# Patient Record
Sex: MALE | Race: WHITE | NOT HISPANIC OR LATINO | Employment: FULL TIME | URBAN - METROPOLITAN AREA
[De-identification: names, ages, dates, MRNs, and addresses within clinical notes are randomized per-mention and may not be internally consistent; named-entity substitution may affect disease eponyms.]

---

## 2018-01-19 ENCOUNTER — TRANSFERRED RECORDS (OUTPATIENT)
Dept: HEALTH INFORMATION MANAGEMENT | Facility: CLINIC | Age: 33
End: 2018-01-19

## 2018-01-31 ENCOUNTER — OFFICE VISIT (OUTPATIENT)
Dept: FAMILY MEDICINE | Facility: CLINIC | Age: 33
End: 2018-01-31
Payer: COMMERCIAL

## 2018-01-31 VITALS
HEART RATE: 85 BPM | HEIGHT: 68 IN | SYSTOLIC BLOOD PRESSURE: 116 MMHG | BODY MASS INDEX: 26.07 KG/M2 | TEMPERATURE: 98.7 F | DIASTOLIC BLOOD PRESSURE: 70 MMHG | WEIGHT: 172 LBS

## 2018-01-31 DIAGNOSIS — M19.019 AC JOINT ARTHROPATHY: ICD-10-CM

## 2018-01-31 DIAGNOSIS — M75.41 IMPINGEMENT SYNDROME, SHOULDER, RIGHT: Primary | ICD-10-CM

## 2018-01-31 PROCEDURE — 99214 OFFICE O/P EST MOD 30 MIN: CPT | Performed by: FAMILY MEDICINE

## 2018-01-31 NOTE — PATIENT INSTRUCTIONS
Thank you for choosing [facility].  You may be receiving a survey in the mail from Sheldon [facility] regarding your visit today.  Please take a few minutes to complete and return the survey to let us know how we are doing.      If you have questions or concerns, please contact us via [employer] or you can contact your care team at [phone].    Our Clinic hours are:  Monday 6:40 am  to 7:00 pm  Tuesday -Friday 6:40 am to 5:00 pm    The [place] outpatient lab hours are:  Monday - Friday 6:10 am to 4:45 pm  Saturdays 7:00 am to 11:00 am  Appointments are required, call [phone]    If you have clinical questions after hours or would like to schedule an appointment,  call the clinic at [phone].      (M75.41) Impingement syndrome, shoulder, right  (primary encounter diagnosis)  Comment:   Plan: SPORTS MEDICINE REFERRAL        We discussed the issues and he will modify activities and avoid use of the arms out to the side and overhead and throwing, if not at work.   Use ice and avoid heat on the area. Use advil and tylenol if helpful. The referral is done for the Sports Med clinic. We discussed the cortisone shot, to do as needed.    (M19.019) AC joint arthropathy  Comment:   Plan: SPORTS MEDICINE REFERRAL        See above.

## 2018-01-31 NOTE — NURSING NOTE
"Chief Complaint   Patient presents with     Shoulder Pain     Right shoulder pain x 2-3 years.       Initial /89  Pulse 85  Temp 98.7  F (37.1  C) (Tympanic)  Ht 5' 8.25\" (1.734 m)  Wt 172 lb (78 kg)  BMI 25.96 kg/m2 Estimated body mass index is 25.96 kg/(m^2) as calculated from the following:    Height as of this encounter: 5' 8.25\" (1.734 m).    Weight as of this encounter: 172 lb (78 kg).  Medication Reconciliation: complete  "

## 2018-01-31 NOTE — PROGRESS NOTES
"  SUBJECTIVE:   Sachin Palma is a 33 year old male who presents to clinic today for the following health issues:      Musculoskeletal problem/pain      Duration: 2-3 years.    Description  Location: Right shoulder pain.  Can have some symptoms for the left shoulder at times.    Intensity:  moderate    Accompanying signs and symptoms: Burning feeling.  Weakness of the right arm.     History  Previous similar problem: no   Previous evaluation:  He saw Dr. Perez, at [facility] for evaluation-had x-rays done.  Showed some arthritis for the left shoulder.  Last week for that appointment.    Precipitating or alleviating factors:  Trauma or overuse: YES- Overhead use with work-he is an .   Aggravating factors include: Upward motion, he is an  and that can make his symptoms worse.    Therapies tried and outcome: Ibuprofen as needed.  When not working over the weekend the pain will feel better.  Tried changing his routine and doing some exercises.  That seemed like it made it worse.  Ice, tense unit.    No current outpatient prescriptions on file.    Patient Active Problem List   Diagnosis     AC joint arthropathy     Impingement syndrome, shoulder, right       Blood pressure 116/70, pulse 85, temperature 98.7  F (37.1  C), temperature source Tympanic, height 5' 8.25\" (1.734 m), weight 172 lb (78 kg).    Exam:  GENERAL APPEARANCE: healthy, alert and no distress  MS: decreased range of motion of the right shoulder in external rotation; and tender to palpation right AC joint. The left shoulder is not tender.   The biceps tendon is not tender.   SKIN: no suspicious lesions or rashes  NEURO: Normal strength and tone, sensory exam grossly normal, mentation intact and speech normal  PSYCH: mentation appears normal and affect normal/bright      (M75.41) Impingement syndrome, shoulder, right  (primary encounter diagnosis)  Comment:   Plan: SPORTS MEDICINE REFERRAL        We discussed the issues " and he will modify activities and avoid use of the arms out to the side and overhead and throwing, if not at work.   Use ice and avoid heat on the area. Use advil and tylenol if helpful. The referral is done for the Sports Med clinic. We discussed the cortisone shot, to do as needed.    (M19.924) AC joint arthropathy  Comment:   Plan: SPORTS MEDICINE REFERRAL        See above.     Dmitry Walden

## 2018-01-31 NOTE — MR AVS SNAPSHOT
After Visit Summary   1/31/2018    Sachin Palma    MRN: [id number]           Patient Information     Date Of Birth          1985        Visit Information        Provider Department      1/31/2018 7:00 AM Dmitry Walden MD [facility]        Today's Diagnoses     Impingement syndrome, shoulder, right    -  1    AC joint arthropathy          Care Instructions          Thank you for choosing [facility].  You may be receiving a survey in the mail from [facility] your visit today.  Please take a few minutes to complete and return the survey to let us know how we are doing.      If you have questions or concerns, please contact us via [email or web address] or you can contact your care team at [phone].    Our Clinic hours are:  Monday 6:40 am  to 7:00 pm  Tuesday -Friday 6:40 am to 5:00 pm    The [place] outpatient lab hours are:  Monday - Friday 6:10 am to 4:45 pm  Saturdays 7:00 am to 11:00 am  Appointments are required, call [phone]    If you have clinical questions after hours or would like to schedule an appointment,  call the clinic at [phone].      (M75.41) Impingement syndrome, shoulder, right  (primary encounter diagnosis)  Comment:   Plan: SPORTS MEDICINE REFERRAL        We discussed the issues and he will modify activities and avoid use of the arms out to the side and overhead and throwing, if not at work.   Use ice and avoid heat on the area. Use advil and tylenol if helpful. The referral is done for the Sports Med clinic. We discussed the cortisone shot, to do as needed.    (M19.019) AC joint arthropathy  Comment:   Plan: SPORTS MEDICINE REFERRAL        See above.             Follow-ups after your visit        Additional Services     SPORTS MEDICINE REFERRAL       Your provider has referred you to:  [facility]: [facility] [phone]   [email or web address]    Please be aware that coverage of these services is  "subject to the terms and limitations of your health insurance plan.  Call member services at your health plan with any benefit or coverage questions.      Please bring the following to your appointment:    >>   Any x-rays, CTs or MRIs which have been performed.  Contact the facility where they were done to arrange for  prior to your scheduled appointment.    >>   List of current medications   >>   This referral request   >>   Any documents/labs given to you for this referral                  Who to contact     If you have questions or need follow up information about today's clinic visit or your schedule please contact [facility] directly at [phone].  Normal or non-critical lab and imaging results will be communicated to you by [employer], letter or phone within 4 business days after the clinic has received the results. If you do not hear from us within 7 days, please contact the clinic through [employer] or phone. If you have a critical or abnormal lab result, we will notify you by phone as soon as possible.  Submit refill requests through [email or web address] or call your pharmacy and they will forward the refill request to us. Please allow 3 business days for your refill to be completed.          Additional Information About Your Visit        [employer] Information     [email or web address] lets you send messages to your doctor, view your test results, renew your prescriptions, schedule appointments and more. To sign up, go to [email or web address] . Click on \"Log in\" on the left side of the screen, which will take you to the Welcome page. Then click on \"Sign up Now\" on the right side of the page.     You will be asked to enter the access code listed below, as well as some personal information. Please follow the directions to create your username and password.     Your access code is: [email or web address]  Expires: 2018  7:48 AM     Your access code will  in 90 days. If you need help or a new code, please call your " "[facility] or [phone].        Care EveryWhere ID     This is your Care EveryWhere ID. This could be used by other organizations to access your [place] medical records  [id number]        Your Vitals Were     Pulse Temperature Height BMI (Body Mass Index)          85 98.7  F (37.1  C) (Tympanic) 5' 8.25\" (1.734 m) 25.96 kg/m2         Blood Pressure from Last 3 Encounters:   01/31/18 145/89   08/18/15 140/78    Weight from Last 3 Encounters:   01/31/18 172 lb (78 kg)              We Performed the Following     SPORTS MEDICINE REFERRAL        Primary Care Provider Fax #    Physician No Ref-Primary [phone]       No address on file        Equal Access to Services     SONIA HUNTER : Medina Daigle, waaxda luqadaha, qaybta kaalmada adeegyada, oz solano . So [facility] [phone].    ATENCIÓN: Si habla español, tiene a trevizo disposición servicios gratuitos de asistencia lingüística. Llame al [phone].    We comply with applicable federal civil rights laws and [place] laws. We do not discriminate on the basis of race, color, national origin, age, disability, sex, sexual orientation, or gender identity.            Thank you!     Thank you for choosing [facility]  for your care. Our goal is always to provide you with excellent care. Hearing back from our patients is one way we can continue to improve our services. Please take a few minutes to complete the written survey that you may receive in the mail after your visit with us. Thank you!             Your Updated Medication List - Protect others around you: Learn how to safely use, store and throw away your medicines at www.disposemymeds.org.      Notice  As of 1/31/2018  7:48 AM    You have not been prescribed any medications.      "

## 2018-02-09 ENCOUNTER — OFFICE VISIT (OUTPATIENT)
Dept: ORTHOPEDICS | Facility: CLINIC | Age: 33
End: 2018-02-09
Payer: COMMERCIAL

## 2018-02-09 VITALS
DIASTOLIC BLOOD PRESSURE: 72 MMHG | WEIGHT: 172 LBS | HEIGHT: 68 IN | BODY MASS INDEX: 26.07 KG/M2 | SYSTOLIC BLOOD PRESSURE: 130 MMHG

## 2018-02-09 DIAGNOSIS — M25.512 ARTHRALGIA OF BOTH ACROMIOCLAVICULAR JOINTS: Primary | ICD-10-CM

## 2018-02-09 DIAGNOSIS — M25.512 BILATERAL SHOULDER PAIN, UNSPECIFIED CHRONICITY: ICD-10-CM

## 2018-02-09 DIAGNOSIS — M25.511 ARTHRALGIA OF BOTH ACROMIOCLAVICULAR JOINTS: Primary | ICD-10-CM

## 2018-02-09 DIAGNOSIS — M25.511 BILATERAL SHOULDER PAIN, UNSPECIFIED CHRONICITY: ICD-10-CM

## 2018-02-09 PROCEDURE — 99203 OFFICE O/P NEW LOW 30 MIN: CPT | Performed by: PEDIATRICS

## 2018-02-09 NOTE — PROGRESS NOTES
Sports Medicine Clinic Visit  PCP: No Ref-Primary, Physician    Sachin Palma is a 33 year old male who is seen in consultation at the request of Dmitry Walden MD presenting with bilateral shoulder pain (R > L).    Injury: Patient reports bilateral shoulder pain (R > L) for the past 2-3 years.  He denies injury, reports insidious onset that has been gradually getting worse very slowly.  Pain is at his AC joints bilaterally, worse with overhead movements and repetitive activities.  - Did see [facility] who discussed injection and surgical options.    Location of Pain: right shoulder  Duration of Pain: 2-3 year(s)  Rating of Pain at worst: 7/10  Rating of Pain Currently: 4/10  Symptoms are better with: nothing  Symptoms are worse with: overhead motions, adduction, weight lifting  Additional Features:   Positive: weakness   Negative: popping, grinding, catching, locking, instability, paresthesias and numbness  Other evaluation and/or treatments so far consists of: Ice, Ibuprofen, rest and chiropractic, home exercises  Prior History of related problems: none previous to past 2-3 years    Social History: , weight lifting, former baseball/football player    Review of Systems  Skin: no bruising, no swelling  Musculoskeletal: as above  Neurologic: no numbness, paresthesias  Remainder of review of systems is negative including constitutional, CV, pulmonary, GI, except as noted in HPI or medical history.    Patient's current problem list, past medical and surgical history, and family history were reviewed.    Patient Active Problem List   Diagnosis     AC joint arthropathy     Impingement syndrome, shoulder, right     No past medical history on file.  No past surgical history on file.  Family History   Problem Relation Age of Onset     Hypertension Mother      Other - See Comments Father      Stroke     Hyperlipidemia Father        Objective  /72 (BP Location: Right arm, Patient Position:  "Sitting, Cuff Size: Adult Large)  Ht 5' 8.25\" (1.734 m)  Wt 172 lb (78 kg)  BMI 25.96 kg/m2    GENERAL APPEARANCE: healthy, alert and no distress   GAIT: NORMAL  SKIN: no suspicious lesions or rashes  HEENT: Sclera clear, anicteric  CV: good peripheral pulses  RESP: Breathing not labored  NEURO: Normal strength and tone, mentation intact and speech normal  PSYCH:  mentation appears normal and affect normal/bright    Bilateral Shoulder exam  Inspection and Posture:       normal    Skin:        no visible deformities    Tender:        acromioclavicular joint bilateral    Non Tender:       remainder of shoulder bilateral    ROM:        Full active and passive ROM with flexion, extension, abduction, internal and external rotation bilateral       asymmetric scapular motion    Painful motions:       end range flexion and elevation bilateral    Strength:        abduction 5/5 bilateral       flexion 5/5 bilateral       internal rotation 5/5 bilateral       external rotation 5/5 bilateral    Impingement testing:       neg (-) Neer bilateral       neg (-) Keita bilateral       neg (-) crossover bilateral       positive (+) O'jessy bilateral    Sensation:        normal sensation over shoulder and upper extremity     Radiology  I reviewed outside images images with the patient  3 XR views of bilateral reviewed: no acute bony abnormality, no significant degenerative change  - will follow official read    Assessment:  1. Arthralgia of both acromioclavicular joints    2. Bilateral shoulder pain, unspecified chronicity      We discussed these other possible diagnosis: Impingement vs. Labral tear  We discussed the following treatment options: symptom treatment, activity modification/rest, imaging, rehab and referral. Following discussion, plan: will start with physical therapy and consider other options for treatment.    Plan:  - Today's Plan of Care:  [phone]    -We also " discussed other future treatment options:  MRI or MRI arthrogram of the right shoulder  Injection (AC joint or subacromial)    Follow Up: 4-6 weeks    Concerning signs and symptoms were reviewed.  The patient expressed understanding of this management plan and all questions were answered at this time.    Thanks for the opportunity to participate in the care of this patient, I will keep you updated on their progress.    CC: Dmitry Salazar MD [place]  Primary Care Sports Medicine  [facility]

## 2018-02-09 NOTE — MR AVS SNAPSHOT
After Visit Summary   2/9/2018    Sachin Palma    MRN: [id number]           Patient Information     Date Of Birth          1985        Visit Information        Provider Department      2/9/2018 2:40 PM Cecilia Salazar MD [facility] [place]        Today's Diagnoses     Arthralgia of both acromioclavicular joints    -  1    Bilateral shoulder pain, unspecified chronicity          Care Instructions    We discussed these other possible diagnosis: Impingement vs. Labral tear    Plan:  - Today's Plan of Care:  Rehab: Physical Therapy: [facility] - [phone]    -We also discussed other future treatment options:  MRI or MRI arthrogram of the right shoulder  Injection (AC joint or subacromial)    Follow Up: 4-6 weeks              Follow-ups after your visit        Additional Services     PHYSICAL THERAPY REFERRAL       *This therapy referral will be filtered to a centralized scheduling office at [facility] and the patient will receive a call to schedule an appointment at a [facility] location most convenient for them. *     [facility] provides Physical Therapy evaluation and treatment and many specialty services across the [facility] system.  If requesting a specialty program, please choose from the list below.    If you have not heard from the scheduling office within 2 business days, please call [phone] for all locations, with the exception of [place], please call [phone].  Treatment: Evaluation & Treatment  Special Instructions/Modalities: None  Special Programs: None    Please be aware that coverage of these services is subject to the terms and limitations of your health insurance plan.  Call member services at your health plan with any benefit or coverage questions.      **Note to Provider:  If you are referring outside of [facility] for the therapy appointment, please list the name of the location in the  "\"special instructions\" above, print the referral and give to the patient to schedule the appointment.                  Who to contact     If you have questions or need follow up information about today's clinic visit or your schedule please contact [facility] directly at [phone].  Normal or non-critical lab and imaging results will be communicated to you by MyChart, letter or phone within 4 business days after the clinic has received the results. If you do not hear from us within 7 days, please contact the clinic through MyChart or phone. If you have a critical or abnormal lab result, we will notify you by phone as soon as possible.  Submit refill requests through [employer] or call your pharmacy and they will forward the refill request to us. Please allow 3 business days for your refill to be completed.          Additional Information About Your Visit        MyChart Information     [employer] lets you send messages to your doctor, view your test results, renew your prescriptions, schedule appointments and more. To sign up, go to [email or web address] . Click on \"Log in\" on the left side of the screen, which will take you to the Welcome page. Then click on \"Sign up Now\" on the right side of the page.     You will be asked to enter the access code listed below, as well as some personal information. Please follow the directions to create your username and password.     Your access code is: [email or web address]  Expires: 2018  7:48 AM     Your access code will  in 90 days. If you need help or a new code, please call your [place] clinic or [phone].        Care EveryWhere ID     This is your Care EveryWhere ID. This could be used by other organizations to access your [place] medical records  [id number]        Your Vitals Were     Height BMI (Body Mass Index)                5' 8.25\" (1.734 m) 25.96 kg/m2           Blood Pressure from Last 3 Encounters:   18 130/72 "   01/31/18 116/70   08/18/15 140/78    Weight from Last 3 Encounters:   02/09/18 172 lb (78 kg)   01/31/18 172 lb (78 kg)              We Performed the Following     PHYSICAL THERAPY REFERRAL        Primary Care Provider Fax #    Physician No Ref-Primary [phone]       No address on file        Equal Access to Services     ANTONIO [facility] Center[facility]URENDRA : Hadii aad ku hadjennifero Soanthonyali, waaxda luqadaha, qaybta kaalmada halleyrabiada, waxay idiin haybaldemaraguila woodyattilaluis solano . So [place] [phone].    ATENCIÓN: Si habla español, tiene a trevizo disposición servicios gratuitos de asistencia lingüística. Basilioame al [phone].    We comply with applicable federal civil rights laws and [place] laws. We do not discriminate on the basis of race, color, national origin, age, disability, sex, sexual orientation, or gender identity.            Thank you!     Thank you for choosing [place] [facility]  for your care. Our goal is always to provide you with excellent care. Hearing back from our patients is one way we can continue to improve our services. Please take a few minutes to complete the written survey that you may receive in the mail after your visit with us. Thank you!             Your Updated Medication List - Protect others around you: Learn how to safely use, store and throw away your medicines at www.disposemymeds.org.      Notice  As of 2/9/2018  3:10 PM    You have not been prescribed any medications.

## 2018-02-09 NOTE — PATIENT INSTRUCTIONS
We discussed these other possible diagnosis: Impingement vs. Labral tear    Plan:  - Today's Plan of Care:  [facility]hab[phone]    -We also discussed other future treatment options:  MRI or MRI arthrogram of the right shoulder  Injection (AC joint or subacromial)    Follow Up: 4-6 weeks

## 2018-02-12 ENCOUNTER — HOSPITAL ENCOUNTER (OUTPATIENT)
Dept: PHYSICAL THERAPY | Facility: CLINIC | Age: 33
Setting detail: THERAPIES SERIES
End: 2018-02-12
Attending: PEDIATRICS
Payer: COMMERCIAL

## 2018-02-12 PROCEDURE — 97161 PT EVAL LOW COMPLEX 20 MIN: CPT | Mod: GP | Performed by: PHYSICAL THERAPIST

## 2018-02-12 PROCEDURE — 40000718 ZZHC STATISTIC PT DEPARTMENT ORTHO VISIT: Performed by: PHYSICAL THERAPIST

## 2018-02-12 PROCEDURE — 97110 THERAPEUTIC EXERCISES: CPT | Mod: GP | Performed by: PHYSICAL THERAPIST

## 2018-02-12 NOTE — PROGRESS NOTES
PHYSICAL THERAPY INITIAL EVALUATION  02/12/18 1400   General Information   Type of Visit Initial OP Ortho PT Evaluation   Start of Care Date 02/12/18   Referring Physician Dr. Salazar   Patient/Family Goals Statement get rid of the pain   Orders Evaluate and Treat   Date of Order 02/09/18   Insurance Type [employer]   Insurance Comments/Visits Authorized no limits   Medical Diagnosis Arthralgia of both acromioclavicular joints, B shoulder pain   Surgical/Medical history reviewed Yes   Precautions/Limitations no known precautions/limitations   Body Part(s)   Body Part(s) Shoulder   Presentation and Etiology   Pertinent history of current problem (include personal factors and/or comorbidities that impact the POC) Patient reports pain for past couple of years. Getting progressively worse. R side is worse than L. Changed his weight lifting routine some.     Impairments A. Pain;B. Decreased WB tolerance;E. Decreased flexibility;F. Decreased strength and endurance;J. Burning   Functional Limitations perform activities of daily living;perform required work activities;perform desired leisure / sports activities   Symptom Location B ant/superior shoulder   How/Where did it occur With repetition/overuse   Onset date of current episode/exacerbation 02/12/17   Chronicity Chronic   Pain rating (0-10 point scale) Best (/10);Worst (/10)   Best (/10) 1   Worst (/10) 7   Pain quality A. Sharp;B. Dull;D. Burning   Frequency of pain/symptoms C. With activity   Pain/symptoms exacerbated by C. Lifting;D. Carrying;H. Overhead reach   Pain/symptoms eased by C. Rest   Progression of symptoms since onset: Worsened   Prior Level of Function   Prior Level of Function-Mobility independent, likes to work out at the gym   Prior Level of Function-ADLs independent   Current Level of Function   Patient role/employment history A. Employed   Employment Comments    Fall Risk Screen   Fall screen completed by PT   Have you fallen 2 or  more times in the past year? No   Have you fallen and had an injury in the past year? No   Is patient a fall risk? No   Functional Scales   Functional Scales Other   Other Scales  SPADI: 22.31%   Shoulder Objective Findings   Side (if bilateral, select both right and left) Right;Left   Observation L hand dominant   Cervical Screen (ROM, quadrant) WNL    Thoracic Mobility Screen Thoracic Rotation AROM 100% B   Scapulothoracic Rhythm asymmetric scapular motion   Pec Minor (supine) Flexibility tight L > R   Neer's Test -   Keita-Liborio Test -   Coracoid Test -   Bursa Test -   [place]'s Test -   Load and Shift Test -   Sulcus Test -   Crossover Test -   Shoulder Special Tests Comments - speeds, - full can and empty can   Palpation tender B AC joint    Accessory Motion/Joint Mobility tight posterior capsule B   Right Shoulder Flexion AROM 180, pain R AC joint at end range   Right Shoulder Abduction PROM 180, pain R AC joint at end range   Right Shoulder ER AROM T3    Right Shoulder ER PROM 90   Right Shoulder IR AROM T10   Right Shoulder IR PROM 65   Right Shoulder Flexion Strength 5/5   Right Shoulder Abduction Strength 5/5   Right Shoulder ER Strength 5/5   Right Shoulder IR Strength 5/5, mild pain at AC joint   Right Mid Trapezius Strength 5-/5   Right Lower Trapezius Strength 4+/5   Left Shoulder Flexion AROM 180   Left Shoulder ER AROM T3   Left Shoulder ER PROM 90   Left Shoulder IR AROM T10    Left Shoulder IR PROM 65   Left Shoulder Flexion Strength 5/5   Left Shoulder Abduction Strength 5/5   Left Shoulder ER Strength 5/5   Left Shoulder IR Strength 5/5   Left Mid Trapezius Strength 5-/5   Left Lower Trapezius Strength 5-/5   Planned Therapy Interventions   Planned Therapy Interventions joint mobilization;manual therapy;neuromuscular re-education;ROM;strengthening;stretching   Clinical Impression   Criteria for Skilled Therapeutic Interventions Met yes, treatment indicated   PT Diagnosis B shoulder pain    Influenced by the following impairments pain, decreased flexibility, decreased strength   Functional limitations due to impairments lifting, reaching overhead, dressing   Clinical Presentation Stable/Uncomplicated   Clinical Presentation Rationale minimally provocative exam, young and motivated   Clinical Decision Making (Complexity) Low complexity   Therapy Frequency other (see comments)  (1x every other week)   Predicted Duration of Therapy Intervention (days/wks) 6 weeks   Risk & Benefits of therapy have been explained Yes   Patient, Family & other staff in agreement with plan of care Yes   Clinical Impression Comments Patient presenting with signs and symptoms consistent with B AC joint arthritis, can not rule out impingement or labral involvement.   Education Assessment   Preferred Learning Style Listening;Demonstration;Pictures/video   Barriers to Learning No barriers   ORTHO GOALS   PT Ortho Eval Goals 1;2;3;4   Ortho Goal 1   Goal Identifier 1   Goal Description Patient will report no pain with reaching overhead.   Target Date 03/26/18   Ortho Goal 2   Goal Identifier 2   Goal Description Patient will report no pain with doffing shirts.   Target Date 03/26/18   Ortho Goal 3   Goal Identifier 3   Goal Description Patient will report minimal to no pain with lifting or carrying.   Target Date 03/26/18   Ortho Goal 4   Goal Identifier 4   Goal Description Patient will be independent and compliant with HEP to aid functional recovery.   Target Date 03/26/18   Total Evaluation Time   Total Evaluation Time 30       Please contact me with any questions or concerns.  Thank you for your referral.    Naila Mace, PT, DPT, OCS  Physical Therapist, Orthopedic Certified Specialist  [facility]  [phone]

## 2018-03-09 ENCOUNTER — HOSPITAL ENCOUNTER (OUTPATIENT)
Dept: PHYSICAL THERAPY | Facility: CLINIC | Age: 33
Setting detail: THERAPIES SERIES
End: 2018-03-09
Attending: PEDIATRICS
Payer: COMMERCIAL

## 2018-03-09 PROCEDURE — 40000718 ZZHC STATISTIC PT DEPARTMENT ORTHO VISIT: Performed by: PHYSICAL THERAPIST

## 2018-03-09 PROCEDURE — 97110 THERAPEUTIC EXERCISES: CPT | Mod: GP | Performed by: PHYSICAL THERAPIST

## 2018-05-09 ENCOUNTER — OFFICE VISIT (OUTPATIENT)
Dept: ORTHOPEDICS | Facility: CLINIC | Age: 33
End: 2018-05-09
Payer: COMMERCIAL

## 2018-05-09 VITALS
HEIGHT: 68 IN | WEIGHT: 170.9 LBS | DIASTOLIC BLOOD PRESSURE: 68 MMHG | BODY MASS INDEX: 25.9 KG/M2 | SYSTOLIC BLOOD PRESSURE: 122 MMHG

## 2018-05-09 DIAGNOSIS — M25.511 CHRONIC RIGHT SHOULDER PAIN: Primary | ICD-10-CM

## 2018-05-09 DIAGNOSIS — M25.512 BILATERAL SHOULDER PAIN, UNSPECIFIED CHRONICITY: ICD-10-CM

## 2018-05-09 DIAGNOSIS — M25.511 BILATERAL SHOULDER PAIN, UNSPECIFIED CHRONICITY: ICD-10-CM

## 2018-05-09 DIAGNOSIS — M25.511 ARTHRALGIA OF BOTH ACROMIOCLAVICULAR JOINTS: ICD-10-CM

## 2018-05-09 DIAGNOSIS — G89.29 CHRONIC RIGHT SHOULDER PAIN: Primary | ICD-10-CM

## 2018-05-09 DIAGNOSIS — M25.512 ARTHRALGIA OF BOTH ACROMIOCLAVICULAR JOINTS: ICD-10-CM

## 2018-05-09 PROCEDURE — 99214 OFFICE O/P EST MOD 30 MIN: CPT | Performed by: PEDIATRICS

## 2018-05-09 NOTE — PROGRESS NOTES
"Sports Medicine Clinic Visit - Interim History May 9, 2018    PCP: No Ref-Primary, Physician    Sachinchapo Palma is a 33 year old male who is seen in f/u up for    Chronic right shoulder pain  Arthralgia of both acromioclavicular joints  Bilateral shoulder pain, unspecified chronicity. Since last visit on 2/9/18, patient has had two PT visits and has been doing home exercises that have \"helped a little\" but still feeling pain in both shoulders.    Initial Injury History 2/9/2018: Patient reports bilateral shoulder pain (R > L) for the past 2-3 years.  He denies injury, reports insidious onset that has been gradually getting worse very slowly.  Pain is at his AC joints bilaterally, worse with overhead movements and repetitive activities.  - Did see [facility] who discussed injection and surgical options.    Symptoms are better with: PT and home exercises  Symptoms are worse with: overhead motions, adduction, weight lifting  Additional Features:   Positive: weakness   Negative: swelling, bruising, popping, grinding, catching, locking, instability, paresthesias, numbness, pain in other joints and systemic symptoms    Social History: , weight lifting multiple days per week    Review of Systems  Skin: no bruising, no swelling  Musculoskeletal: as above  Neurologic: no numbness, paresthesias  Remainder of review of systems is negative including constitutional, CV, pulmonary, GI, except as noted in HPI or medical history.    Patient's current problem list, past medical and surgical history, and family history were reviewed.    Patient Active Problem List   Diagnosis     AC joint arthropathy     Impingement syndrome, shoulder, right     No past medical history on file.  No past surgical history on file.  Family History   Problem Relation Age of Onset     Hypertension Mother      Other - See Comments Father      Stroke     Hyperlipidemia Father        Objective  /68 (BP Location: Left arm, Patient " "Position: Sitting, Cuff Size: Adult Large)  Ht 5' 8.25\" (1.734 m)  Wt 170 lb 14.4 oz (77.5 kg)  BMI 25.8 kg/m2    GENERAL APPEARANCE: healthy, alert and no distress   GAIT: NORMAL  SKIN: no suspicious lesions or rashes  HEENT: Sclera clear, anicteric  CV: good peripheral pulses  RESP: Breathing not labored  NEURO: Normal strength and tone, mentation intact and speech normal  PSYCH:  mentation appears normal and affect normal/bright    Bilateral Shoulder exam  Inspection and Posture:       normal     Skin:        no visible deformities     Tender:        acromioclavicular joint bilateral     Non Tender:       remainder of shoulder bilateral     ROM:        Full active and passive ROM with flexion, extension, abduction, internal and external rotation bilateral       asymmetric scapular motion     Painful motions:       end range flexion and elevation bilateral     Strength:        abduction 5/5 bilateral       flexion 5/5 bilateral       internal rotation 5/5 bilateral       external rotation 5/5 bilateral     Impingement testing:       neg (-) Neer bilateral       neg (-) Keita bilateral       neg (-) crossover bilateral       positive (+) O'jessy bilateral     Sensation:        normal sensation over shoulder and upper extremity       Radiology  None new    Assessment:  1. Chronic right shoulder pain    2. Arthralgia of both acromioclavicular joints    3. Bilateral shoulder pain, unspecified chronicity      Mild improvement with PT.  We discussed the following treatment options: symptom treatment, activity modification/rest, imaging, rehab and referral. Following discussion, plan: will obtain MRI imaging.  Preference for arthrogram, however, MRI without contrast would likely be adequate.    Plan:  - Today's Plan of Care:  MRI Arthrogram of the right shoulder--Call [phone] to schedule MRI    -We also discussed other future treatment options:  MRI of the right shoulder    Follow Up: In clinic with Dr." Martin after MRI (wait at least 1-2 days)    Concerning signs and symptoms were reviewed.  The patient expressed understanding of this management plan and all questions were answered at this time.    Cecilia Salazar MD CA  Primary Care Sports Medicine  [facility]

## 2018-05-09 NOTE — LETTER
"    5/9/2018         RE: Sachin Palma  [address]  [place] [facility] [address]        Dear Colleague,    Thank you for referring your patient, Sachin Palma, to the [facility]. Please see a copy of my visit note below.    Sports Medicine Clinic Visit - Interim History May 9, 2018    PCP: No Ref-Primary, Physician    Sachin Palma is a 33 year old male who is seen in f/u up for    Chronic right shoulder pain  Arthralgia of both acromioclavicular joints  Bilateral shoulder pain, unspecified chronicity. Since last visit on 2/9/18, patient has had two PT visits and has been doing home exercises that have \"helped a little\" but still feeling pain in both shoulders.    Initial Injury History 2/9/2018: Patient reports bilateral shoulder pain (R > L) for the past 2-3 years.  He denies injury, reports insidious onset that has been gradually getting worse very slowly.  Pain is at his AC joints bilaterally, worse with overhead movements and repetitive activities.  - Did see [facility] who discussed injection and surgical options.    Symptoms are better with: PT and home exercises  Symptoms are worse with: overhead motions, adduction, weight lifting  Additional Features:   Positive: weakness   Negative: swelling, bruising, popping, grinding, catching, locking, instability, paresthesias, numbness, pain in other joints and systemic symptoms    Social History: , weight lifting multiple days per week    Review of Systems  Skin: no bruising, no swelling  Musculoskeletal: as above  Neurologic: no numbness, paresthesias  Remainder of review of systems is negative including constitutional, CV, pulmonary, GI, except as noted in HPI or medical history.    Patient's current problem list, past medical and surgical history, and family history were reviewed.    Patient Active Problem List   Diagnosis     AC joint arthropathy     Impingement syndrome, shoulder, right     No past " "medical history on file.  No past surgical history on file.  Family History   Problem Relation Age of Onset     Hypertension Mother      Other - See Comments Father      Stroke     Hyperlipidemia Father        Objective  /68 (BP Location: Left arm, Patient Position: Sitting, Cuff Size: Adult Large)  Ht 5' 8.25\" (1.734 m)  Wt 170 lb 14.4 oz (77.5 kg)  BMI 25.8 kg/m2    GENERAL APPEARANCE: healthy, alert and no distress   GAIT: NORMAL  SKIN: no suspicious lesions or rashes  HEENT: Sclera clear, anicteric  CV: good peripheral pulses  RESP: Breathing not labored  NEURO: Normal strength and tone, mentation intact and speech normal  PSYCH:  mentation appears normal and affect normal/bright    Bilateral Shoulder exam  Inspection and Posture:       normal     Skin:        no visible deformities     Tender:        acromioclavicular joint bilateral     Non Tender:       remainder of shoulder bilateral     ROM:        Full active and passive ROM with flexion, extension, abduction, internal and external rotation bilateral       asymmetric scapular motion     Painful motions:       end range flexion and elevation bilateral     Strength:        abduction 5/5 bilateral       flexion 5/5 bilateral       internal rotation 5/5 bilateral       external rotation 5/5 bilateral     Impingement testing:       neg (-) Neer bilateral       neg (-) Keita bilateral       neg (-) crossover bilateral       positive (+) O'jessy bilateral     Sensation:        normal sensation over shoulder and upper extremity       Radiology  None new    Assessment:  1. Chronic right shoulder pain    2. Arthralgia of both acromioclavicular joints    3. Bilateral shoulder pain, unspecified chronicity      Mild improvement with PT.  We discussed the following treatment options: symptom treatment, activity modification/rest, imaging, rehab and referral. Following discussion, plan: will obtain MRI imaging.  Preference for arthrogram, however, MRI without " contrast would likely be adequate.    Plan:  - Today's Plan of Care:  MRI Arthrogram of the right shoulder--Call [phone] to schedule MRI    -We also discussed other future treatment options:  MRI of the right shoulder    Follow Up: In clinic with Dr. Salazar after MRI (wait at least 1-2 days)    Concerning signs and symptoms were reviewed.  The patient expressed understanding of this management plan and all questions were answered at this time.    Cecilia Salazar MD [facility]  Primary Care Sports Medicine  [facility]    Again, thank you for allowing me to participate in the care of your patient.        Sincerely,        Cecilia Salazar MD

## 2018-05-09 NOTE — MR AVS SNAPSHOT
After Visit Summary   5/9/2018    Sachin Palma    MRN: [id number]           Patient Information     Date Of Birth          1985        Visit Information        Provider Department      5/9/2018 8:40 AM Cecilia Salazar MD [facility]        Today's Diagnoses     Chronic right shoulder pain    -  1      Care Instructions    Plan:  - Today's Plan of Care:  MRI Arthrogram of the right shoulder--Call [phone] to schedule MRI    -We also discussed other future treatment options:  MRI of the right shoulder    Follow Up: In clinic with Dr. Salazar after MRI (wait at least 1-2 days)    If you have any further questions for your physician or physician s care team you can call [phone] and use option 3 to leave a voice message. Calls received during business hours will be returned same day.    If you have questions about the cost of your care or procedure, please contact the [employer] line at [phone], or visit [email or web address] for an estimate.  You may be asked for your name, date of birth, phone number, insurance information, and description or CPT code for test/procedure.            Follow-ups after your visit        Future tests that were ordered for you today     Open Future Orders        Priority Expected Expires Ordered    MR Shoulder Right w Contrast Routine  5/9/2019 5/9/2018    XR Gadolinium Injection Routine 5/9/2018 5/9/2019 5/9/2018            Who to contact     If you have questions or need follow up information about today's clinic visit or your schedule please contact [facility] directly at [phone].  Normal or non-critical lab and imaging results will be communicated to you by MyChart, letter or phone within 4 business days after the clinic has received the results. If you do not hear from us within 7 days, please contact the clinic through MyChart or phone. If you have a critical or abnormal  "lab result, we will notify you by phone as soon as possible.  Submit refill requests through [employer] or call your pharmacy and they will forward the refill request to us. Please allow 3 business days for your refill to be completed.          Additional Information About Your Visit        [employer] Information     [employer] lets you send messages to your doctor, view your test results, renew your prescriptions, schedule appointments and more. To sign up, go to [email or web address] . Click on \"Log in\" on the left side of the screen, which will take you to the Welcome page. Then click on \"Sign up Now\" on the right side of the page.     You will be asked to enter the access code listed below, as well as some personal information. Please follow the directions to create your username and password.     Your access code is: [email or web address]  Expires: 2018  9:19 AM     Your access code will  in 90 days. If you need help or a new code, please call your [facility] [place] clinic or [phone].        Care EveryWhere ID     This is your Care EveryWhere ID. This could be used by other organizations to access your [facility] medical records  [id number]        Your Vitals Were     Height BMI (Body Mass Index)                5' 8.25\" (1.734 m) 25.8 kg/m2           Blood Pressure from Last 3 Encounters:   18 122/68   18 130/72   18 116/70    Weight from Last 3 Encounters:   18 170 lb 14.4 oz (77.5 kg)   18 172 lb (78 kg)   18 172 lb (78 kg)               Primary Care Provider Fax #    Physician No Ref-Primary [phone]       No address on file        Equal Access to Services     SONIA HUNTER : Medina Daigle, tramaine hall, sanam castellanosalnany arevalo, oz lindquist. [facility] [phone].    ATENCIÓN: Si habla español, tiene a trevizo disposición servicios gratuitos de asistencia lingüística. Llame al [phone].    We comply with applicable federal civil " rights laws and [place] laws. We do not discriminate on the basis of race, color, national origin, age, disability, sex, sexual orientation, or gender identity.            Thank you!     Thank you for choosing [facility]  for your care. Our goal is always to provide you with excellent care. Hearing back from our patients is one way we can continue to improve our services. Please take a few minutes to complete the written survey that you may receive in the mail after your visit with us. Thank you!             Your Updated Medication List - Protect others around you: Learn how to safely use, store and throw away your medicines at www.disposemymeds.org.      Notice  As of 5/9/2018  9:20 AM    You have not been prescribed any medications.

## 2018-05-09 NOTE — PATIENT INSTRUCTIONS
Plan:  - Today's Plan of Care:  MRI Arthrogram of the right shoulder--Call [phone] to schedule MRI    -We also discussed other future treatment options:  MRI of the right shoulder    Follow Up: In clinic with Dr. Salazar after MRI (wait at least 1-2 days)    If you have any further questions for your physician or physician s care team you can call [phone] and use option 3 to leave a voice message. Calls received during business hours will be returned same day.    If you have questions about the cost of your care or procedure, please contact the [employer] price line at [phone], or visit [email or web address] for an estimate.  You may be asked for your name, date of birth, phone number, insurance information, and description or CPT code for test/procedure.

## 2018-05-14 ENCOUNTER — HOSPITAL ENCOUNTER (OUTPATIENT)
Dept: MRI IMAGING | Facility: CLINIC | Age: 33
End: 2018-05-14
Attending: PEDIATRICS
Payer: COMMERCIAL

## 2018-05-14 ENCOUNTER — HOSPITAL ENCOUNTER (OUTPATIENT)
Dept: GENERAL RADIOLOGY | Facility: CLINIC | Age: 33
Discharge: HOME | End: 2018-05-14
Attending: PEDIATRICS | Admitting: PEDIATRICS
Payer: COMMERCIAL

## 2018-05-14 DIAGNOSIS — M25.511 CHRONIC RIGHT SHOULDER PAIN: ICD-10-CM

## 2018-05-14 DIAGNOSIS — G89.29 CHRONIC RIGHT SHOULDER PAIN: ICD-10-CM

## 2018-05-14 PROCEDURE — 25500064 ZZH RX 255 OP 636: Performed by: RADIOLOGY

## 2018-05-14 PROCEDURE — 25000125 ZZHC RX 250: Performed by: RADIOLOGY

## 2018-05-14 PROCEDURE — 25000128 H RX IP 250 OP 636: Performed by: RADIOLOGY

## 2018-05-14 PROCEDURE — 73222 MRI JOINT UPR EXTREM W/DYE: CPT | Mod: RT

## 2018-05-14 PROCEDURE — 40000265 XR GADOLINIUM INJECTION

## 2018-05-14 PROCEDURE — A9579 GAD-BASE MR CONTRAST NOS,1ML: HCPCS | Performed by: RADIOLOGY

## 2018-05-14 PROCEDURE — 27210995 ZZH RX 272: Performed by: RADIOLOGY

## 2018-05-14 RX ORDER — IOPAMIDOL 408 MG/ML
10 INJECTION, SOLUTION INTRATHECAL ONCE
Status: COMPLETED | OUTPATIENT
Start: 2018-05-14 | End: 2018-05-14

## 2018-05-14 RX ORDER — ACYCLOVIR 200 MG/1
3 CAPSULE ORAL ONCE
Status: COMPLETED | OUTPATIENT
Start: 2018-05-14 | End: 2018-05-14

## 2018-05-14 RX ORDER — EPINEPHRINE 1 MG/ML
0.1 INJECTION, SOLUTION, CONCENTRATE INTRAVENOUS ONCE
Status: COMPLETED | OUTPATIENT
Start: 2018-05-14 | End: 2018-05-14

## 2018-05-14 RX ORDER — LIDOCAINE HYDROCHLORIDE 10 MG/ML
5 INJECTION, SOLUTION EPIDURAL; INFILTRATION; INTRACAUDAL; PERINEURAL ONCE
Status: COMPLETED | OUTPATIENT
Start: 2018-05-14 | End: 2018-05-14

## 2018-05-14 RX ADMIN — SODIUM CHLORIDE 12 ML: 9 INJECTION, SOLUTION INTRAMUSCULAR; INTRAVENOUS; SUBCUTANEOUS at 10:44

## 2018-05-14 RX ADMIN — IOPAMIDOL 1 ML: 408 INJECTION, SOLUTION INTRATHECAL at 10:45

## 2018-05-14 RX ADMIN — LIDOCAINE HYDROCHLORIDE 2 ML: 10 INJECTION, SOLUTION EPIDURAL; INFILTRATION; INTRACAUDAL; PERINEURAL at 10:46

## 2018-05-14 RX ADMIN — GADOPENTETATE DIMEGLUMINE 0.15 ML: 469.01 INJECTION INTRAVENOUS at 10:48

## 2018-05-14 RX ADMIN — EPINEPHRINE 0.02 MG: 1 INJECTION, SOLUTION, CONCENTRATE INTRAVENOUS at 10:47

## 2018-05-17 ENCOUNTER — TELEPHONE (OUTPATIENT)
Dept: ORTHOPEDICS | Facility: CLINIC | Age: 33
End: 2018-05-17

## 2018-05-17 NOTE — TELEPHONE ENCOUNTER
Patient called at 15:22. He wanted to let us know that he has his MRI done on Monday. He said that he has an appointment for next Wednesday to go over the results but if we wanted to look at it early he wanted to make sure to know that he had it done.    Toshia Avery RT (R)

## 2018-05-17 NOTE — TELEPHONE ENCOUNTER
MR RIGHT SHOULDER WITH CONTRAST 5/14/2018 11:19 AM      HISTORY: Evaluate for labral tear. Chronic right shoulder pain.     TECHNIQUE:  Multiplanar, multisequence with intraarticular contrast.  Injection procedure dictated separately.     FINDINGS:  Osseous Acromion Outlet:  There is active AC arthrosis, including a  small amount of joint fluid, reactive bone edema, and mild inferior  hypertrophic change.  Mild lateral acromial downsloping. Type 1  acromion.  No os acromiale.     Rotator Cuff: No supraspinatus tendon tear or significant tendinosis.  Small focus of mild infraspinatus tendinosis. No subscapularis tendon  tear or significant tendinosis.  Intact teres minor tendon. No  asymmetric muscle edema or atrophy.      Labral Structures: There is tearing of the superior labrum (SLAP  lesion). This extends anteriorly to the mid anterior labrum, as well  as posterosuperiorly. There is also tearing along the inferior labrum  and anteroinferior labrum. There is tearing of the mid posterior and  posteroinferior labrum. The tearing appears almost completely  circumferential. There is labral cyst formation (measuring 2.2 cm)  posterosuperiorly which extends near the spinoglenoid notch. There is  also mild labral cyst formation anteriorly, inferiorly, posteriorly.     Biceps Tendon: No tear, dislocation, or significant tendinosis.     Osseous and Cartilaginous Structures:  No bone contusion or fracture.  No glenohumeral osteoarthritis or apparent chondromalacia identified.     Joint Space: No definite synovitis is appreciated.     Additional Findings: No gadolinium within the subacromial-subdeltoid  bursa.          IMPRESSION:  1. Labral tearing which is almost completely circumferential.  Associated labral cyst formation.  2. AC arthrosis.  3. Mild focal infraspinatus tendinosis.     KORI COLEMAN MD

## 2018-05-17 NOTE — TELEPHONE ENCOUNTER
Please call patient with MRI results as noted below:    IMPRESSION:  1. Labral tearing which is almost completely circumferential.  Associated labral cyst formation.  2. AC arthrosis.  3. Mild focal infraspinatus tendinosis.    In Summary:  - Labral tearing with labral cyst  - AC arthritis  - RC tendinosis    I Recommend:  - Options for further treatment including continued PT, surgical referral or consideration of injections (AC v. Glenohumeral with Dr. Woodruff under US guidance)  - Can discuss options further at follow up visit if desired    Cecilia Salazar MD

## 2018-05-21 NOTE — TELEPHONE ENCOUNTER
Spoke to patient discussed MRI shoulder results per Dr Salazar. Patient elected to return to clinic for f/u to discuss next steps. Patient scheduled for an appointment on 5/23/2018.     No action needed by physician.  Elodia Santiago ATC

## 2018-05-23 ENCOUNTER — OFFICE VISIT (OUTPATIENT)
Dept: ORTHOPEDICS | Facility: CLINIC | Age: 33
End: 2018-05-23
Payer: COMMERCIAL

## 2018-05-23 VITALS
SYSTOLIC BLOOD PRESSURE: 124 MMHG | BODY MASS INDEX: 25.76 KG/M2 | HEIGHT: 68 IN | DIASTOLIC BLOOD PRESSURE: 86 MMHG | WEIGHT: 170 LBS

## 2018-05-23 DIAGNOSIS — S43.431D TEAR OF RIGHT GLENOID LABRUM, SUBSEQUENT ENCOUNTER: ICD-10-CM

## 2018-05-23 DIAGNOSIS — M25.511 CHRONIC RIGHT SHOULDER PAIN: Primary | ICD-10-CM

## 2018-05-23 DIAGNOSIS — M25.511 ARTHRALGIA OF RIGHT ACROMIOCLAVICULAR JOINT: ICD-10-CM

## 2018-05-23 DIAGNOSIS — G89.29 CHRONIC RIGHT SHOULDER PAIN: Primary | ICD-10-CM

## 2018-05-23 PROCEDURE — 99213 OFFICE O/P EST LOW 20 MIN: CPT | Performed by: PEDIATRICS

## 2018-05-23 NOTE — PROGRESS NOTES
"Sports Medicine Clinic Visit - Interim History May 23, 2018    PCP: No Ref-Primary, Physician    Sachinchapo Palma is a 33 year old male who is seen in f/u up for    Chronic right shoulder pain  Arthralgia of both acromioclavicular joints  Since last visit on 5/9/2018, patient has continued to have aching through the AC joint. He has had a lighter load at work however continues to have pain. He is continuing home exercises. His concern is having the ability to bow hunt in September. Here to review MRI results.    Symptoms are better with: nothing  Symptoms are worse with: lifting with resistance, overhead motions  Additional Features:   Positive: weakness   Negative: swelling, bruising, popping, grinding, catching, locking, instability, paresthesias, numbness, pain in other joints and systemic symptoms    Social History: , weight lifting multiple days per week    Review of Systems  Skin: no bruising, no swelling  Musculoskeletal: as above  Neurologic: no numbness, paresthesias  Remainder of review of systems is negative including constitutional, CV, pulmonary, GI, except as noted in HPI or medical history.    Patient's current problem list, past medical and surgical history, and family history were reviewed.    Patient Active Problem List   Diagnosis     AC joint arthropathy     Impingement syndrome, shoulder, right     History reviewed. No pertinent past medical history.  History reviewed. No pertinent surgical history.  Family History   Problem Relation Age of Onset     Hypertension Mother      Other - See Comments Father      Stroke     Hyperlipidemia Father        Objective  /86  Ht 5' 8.25\" (1.734 m)  Wt 170 lb (77.1 kg)  BMI 25.66 kg/m2    GENERAL APPEARANCE: healthy, alert and no distress   GAIT: NORMAL  SKIN: no suspicious lesions or rashes  HEENT: Sclera clear, anicteric  CV: good peripheral pulses  RESP: Breathing not labored  NEURO: Normal strength and tone, mentation intact and " speech normal  PSYCH:  mentation appears normal and affect normal/bright    Bilateral Shoulder exam  Inspection and Posture:       normal      Skin:        no visible deformities      Tender:        acromioclavicular joint right      Non Tender:       remainder of shoulder bilateral      ROM:        Full active and passive ROM with flexion, extension, abduction, internal and external rotation bilateral       asymmetric scapular motion      Painful motions:       end range flexion and elevation bilateral      Strength:        abduction 5/5 bilateral       flexion 5/5 bilateral       internal rotation 5/5 bilateral       external rotation 5/5 bilateral      Impingement testing:       neg (-) Neer bilateral       neg (-) Keita bilateral       neg (-) crossover bilateral       positive (+) O'jessy bilateral      Sensation:        normal sensation over shoulder and upper extremity     Radiology  I ordered, visualized and reviewed these images with the patient  Results for orders placed or performed during the hospital encounter of 05/14/18   MR Shoulder Right w Contrast    Narrative    MR RIGHT SHOULDER WITH CONTRAST 5/14/2018 11:19 AM     HISTORY: Evaluate for labral tear. Chronic right shoulder pain.    TECHNIQUE:  Multiplanar, multisequence with intraarticular contrast.  Injection procedure dictated separately.    FINDINGS:  Osseous Acromion Outlet:  There is active AC arthrosis, including a  small amount of joint fluid, reactive bone edema, and mild inferior  hypertrophic change.  Mild lateral acromial downsloping. Type 1  acromion.  No os acromiale.    Rotator Cuff: No supraspinatus tendon tear or significant tendinosis.  Small focus of mild infraspinatus tendinosis. No subscapularis tendon  tear or significant tendinosis.  Intact teres minor tendon. No  asymmetric muscle edema or atrophy.     Labral Structures: There is tearing of the superior labrum (SLAP  lesion). This extends anteriorly to the mid anterior  labrum, as well  as posterosuperiorly. There is also tearing along the inferior labrum  and anteroinferior labrum. There is tearing of the mid posterior and  posteroinferior labrum. The tearing appears almost completely  circumferential. There is labral cyst formation (measuring 2.2 cm)  posterosuperiorly which extends near the spinoglenoid notch. There is  also mild labral cyst formation anteriorly, inferiorly, posteriorly.    Biceps Tendon: No tear, dislocation, or significant tendinosis.    Osseous and Cartilaginous Structures:  No bone contusion or fracture.  No glenohumeral osteoarthritis or apparent chondromalacia identified.    Joint Space: No definite synovitis is appreciated.    Additional Findings: No gadolinium within the subacromial-subdeltoid  bursa.       Impression    IMPRESSION:  1. Labral tearing which is almost completely circumferential.  Associated labral cyst formation.  2. AC arthrosis.  3. Mild focal infraspinatus tendinosis.    KORI COLEMAN MD       Assessment:  1. Chronic right shoulder pain    2. Arthralgia of right acromioclavicular joint    3. Tear of right glenoid labrum, subsequent encounter      Right shoulder pain with evidence of AC joint arthrosis as well as labral tearing.  Most of patient's symptoms currently seem to be at AC joint.  We discussed options of trial of AC joint injection, glenohumeral injection or surgical referral.  We will start with an injection and I would target the AC joint first given current symptoms.  Continue home exercise program.    Plan:  Today's Plan of Care:  Referral to Dr. Foster Woodruff for US guided AC joint injection. Scheduling will call you to schedule.    -We also discussed other future treatment options:  Referral to orthopedic surgery    Follow Up: As needed if symptoms fail to improve or worsen. Please call with any questions or concerns.    Concerning signs and symptoms were reviewed.  The patient expressed understanding of this management plan  and all questions were answered at this time.    Cecilia Salazar MD CAQ  Primary Care Sports Medicine  [facility]

## 2018-05-23 NOTE — LETTER
"    5/23/2018         RE: Sachin Palma  [address] [place] [address]        Dear Colleague,    Thank you for referring your patient, Sachin Palma, to the [facility]. Please see a copy of my visit note below.    Sports Medicine Clinic Visit - Interim History May 23, 2018    PCP: No Ref-Primary, Physician    Sachin Palma is a 33 year old male who is seen in f/u up for    Chronic right shoulder pain  Arthralgia of both acromioclavicular joints  Since last visit on 5/9/2018, patient has continued to have aching through the AC joint. He has had a lighter load at work however continues to have pain. He is continuing home exercises. His concern is having the ability to bow hunt in September. Here to review MRI results.    Symptoms are better with: nothing  Symptoms are worse with: lifting with resistance, overhead motions  Additional Features:   Positive: weakness   Negative: swelling, bruising, popping, grinding, catching, locking, instability, paresthesias, numbness, pain in other joints and systemic symptoms    Social History: , weight lifting multiple days per week    Review of Systems  Skin: no bruising, no swelling  Musculoskeletal: as above  Neurologic: no numbness, paresthesias  Remainder of review of systems is negative including constitutional, CV, pulmonary, GI, except as noted in HPI or medical history.    Patient's current problem list, past medical and surgical history, and family history were reviewed.    Patient Active Problem List   Diagnosis     AC joint arthropathy     Impingement syndrome, shoulder, right     History reviewed. No pertinent past medical history.  History reviewed. No pertinent surgical history.  Family History   Problem Relation Age of Onset     Hypertension Mother      Other - See Comments Father      Stroke     Hyperlipidemia Father        Objective  /86  Ht 5' 8.25\" (1.734 m)  Wt 170 lb (77.1 kg)  BMI " 25.66 kg/m2    GENERAL APPEARANCE: healthy, alert and no distress   GAIT: NORMAL  SKIN: no suspicious lesions or rashes  HEENT: Sclera clear, anicteric  CV: good peripheral pulses  RESP: Breathing not labored  NEURO: Normal strength and tone, mentation intact and speech normal  PSYCH:  mentation appears normal and affect normal/bright    Bilateral Shoulder exam  Inspection and Posture:       normal      Skin:        no visible deformities      Tender:        acromioclavicular joint right      Non Tender:       remainder of shoulder bilateral      ROM:        Full active and passive ROM with flexion, extension, abduction, internal and external rotation bilateral       asymmetric scapular motion      Painful motions:       end range flexion and elevation bilateral      Strength:        abduction 5/5 bilateral       flexion 5/5 bilateral       internal rotation 5/5 bilateral       external rotation 5/5 bilateral      Impingement testing:       neg (-) Neer bilateral       neg (-) Keita bilateral       neg (-) crossover bilateral       positive (+) O'jessy bilateral      Sensation:        normal sensation over shoulder and upper extremity     Radiology  I ordered, visualized and reviewed these images with the patient  Results for orders placed or performed during the hospital encounter of 05/14/18   MR Shoulder Right w Contrast    Narrative    MR RIGHT SHOULDER WITH CONTRAST 5/14/2018 11:19 AM     HISTORY: Evaluate for labral tear. Chronic right shoulder pain.    TECHNIQUE:  Multiplanar, multisequence with intraarticular contrast.  Injection procedure dictated separately.    FINDINGS:  Osseous Acromion Outlet:  There is active AC arthrosis, including a  small amount of joint fluid, reactive bone edema, and mild inferior  hypertrophic change.  Mild lateral acromial downsloping. Type 1  acromion.  No os acromiale.    Rotator Cuff: No supraspinatus tendon tear or significant tendinosis.  Small focus of mild infraspinatus  tendinosis. No subscapularis tendon  tear or significant tendinosis.  Intact teres minor tendon. No  asymmetric muscle edema or atrophy.     Labral Structures: There is tearing of the superior labrum (SLAP  lesion). This extends anteriorly to the mid anterior labrum, as well  as posterosuperiorly. There is also tearing along the inferior labrum  and anteroinferior labrum. There is tearing of the mid posterior and  posteroinferior labrum. The tearing appears almost completely  circumferential. There is labral cyst formation (measuring 2.2 cm)  posterosuperiorly which extends near the spinoglenoid notch. There is  also mild labral cyst formation anteriorly, inferiorly, posteriorly.    Biceps Tendon: No tear, dislocation, or significant tendinosis.    Osseous and Cartilaginous Structures:  No bone contusion or fracture.  No glenohumeral osteoarthritis or apparent chondromalacia identified.    Joint Space: No definite synovitis is appreciated.    Additional Findings: No gadolinium within the subacromial-subdeltoid  bursa.       Impression    IMPRESSION:  1. Labral tearing which is almost completely circumferential.  Associated labral cyst formation.  2. AC arthrosis.  3. Mild focal infraspinatus tendinosis.    KORI COLEMAN MD       Assessment:  1. Chronic right shoulder pain    2. Arthralgia of right acromioclavicular joint    3. Tear of right glenoid labrum, subsequent encounter      Right shoulder pain with evidence of AC joint arthrosis as well as labral tearing.  Most of patient's symptoms currently seem to be at AC joint.  We discussed options of trial of AC joint injection, glenohumeral injection or surgical referral.  We will start with an injection and I would target the AC joint first given current symptoms.  Continue home exercise program.    Plan:  Today's Plan of Care:  Referral to Dr. Foster Woodruff for US guided AC joint injection. Scheduling will call you to schedule.    -We also discussed other future  treatment options:  Referral to orthopedic surgery    Follow Up: As needed if symptoms fail to improve or worsen. Please call with any questions or concerns.    Concerning signs and symptoms were reviewed.  The patient expressed understanding of this management plan and all questions were answered at this time.    Cecilia Salazar MD CA  Primary Care Sports Medicine  [facility]        Again, thank you for allowing me to participate in the care of your patient.        Sincerely,        Cecilia Salazar MD

## 2018-05-23 NOTE — PATIENT INSTRUCTIONS
Today's Plan of Care:  Referral to Dr. Foster Woodruff for US guided AC joint injection. Scheduling will call you to schedule.    -We also discussed other future treatment options:  Referral to orthopedic surgery    Follow Up: As needed if symptoms fail to improve or worsen. Please call with any questions or concerns.

## 2018-05-23 NOTE — MR AVS SNAPSHOT
After Visit Summary   5/23/2018    Sachin Palma    MRN: [id number]           Patient Information     Date Of Birth          1985        Visit Information        Provider Department      5/23/2018 8:00 AM Cecilia Salazar MD [facility]        Today's Diagnoses     Chronic right shoulder pain    -  1    Arthralgia of right acromioclavicular joint        Tear of right glenoid labrum, subsequent encounter          Care Instructions    Today's Plan of Care:  Referral to Dr. Foster Woodruff for US guided AC joint injection. Scheduling will call you to schedule.    -We also discussed other future treatment options:  Referral to orthopedic surgery    Follow Up: As needed if symptoms fail to improve or worsen. Please call with any questions or concerns.               Follow-ups after your visit        Additional Services     ORTHO  REFERRAL       [facility] is referring you to the Orthopedic  Services at [facility].       The  Representative will assist you in the coordination of your Orthopedic and Musculoskeletal Care as prescribed by your physician.    The  Representative will call you within 24 hours to help schedule your appointment, or you may contact the  Representative at:    [facility] ~ [phone]  [facility] ~ [phone]  [facility] ~ [phone]    Type of Referral : Non Surgical - US guided AC joint injection with Dr. Woodruff      Timeframe requested: Within 2 weeks     Coverage of these services is subject to the terms and limitations of your health insurance plan.  Please call member services at your health plan with any benefit or coverage questions.      If X-rays, CT or MRI's have been performed, please contact the facility where they were done to arrange for , prior to your scheduled appointment.  Please bring this  "referral request to your appointment and present it to your specialist.                  Who to contact     If you have questions or need follow up information about today's clinic visit or your schedule please contact [facility] directly at [phone].  Normal or non-critical lab and imaging results will be communicated to you by MyChart, letter or phone within 4 business days after the clinic has received the results. If you do not hear from us within 7 days, please contact the clinic through [employer] or phone. If you have a critical or abnormal lab result, we will notify you by phone as soon as possible.  Submit refill requests through [employer] or call your pharmacy and they will forward the refill request to us. Please allow 3 business days for your refill to be completed.          Additional Information About Your Visit        [employer] Information     [employer] gives you secure access to your electronic health record. If you see a primary care provider, you can also send messages to your care team and make appointments. If you have questions, please call your primary care clinic.  If you do not have a primary care provider, please call [phone] and they will assist you.        Care EveryWhere ID     This is your Care EveryWhere ID. This could be used by other organizations to access your [facility] medical records  [id number]        Your Vitals Were     Height BMI (Body Mass Index)                5' 8.25\" (1.734 m) 25.66 kg/m2           Blood Pressure from Last 3 Encounters:   05/23/18 124/86   05/09/18 122/68   02/09/18 130/72    Weight from Last 3 Encounters:   05/23/18 170 lb (77.1 kg)   05/09/18 170 lb 14.4 oz (77.5 kg)   02/09/18 172 lb (78 kg)              We Performed the Following     ORTHO  REFERRAL        Primary Care Provider Fax #    Physician No Ref-Primary [phone]       No address on file        Equal Access to Services     SONIA PAEZ: Medina chairez " adelian Daigle, tramaine hall, sanam zepedamayulisa rowleyrabiayulisa, waxkaren joann woodyattilaluis solano lexa. So [facility] [phone].    ATENCIÓN: Si habla español, tiene a trevizo disposición servicios gratuitos de asistencia lingüística. Llame al [phone].    We comply with applicable federal civil rights laws and [place] laws. We do not discriminate on the basis of race, color, national origin, age, disability, sex, sexual orientation, or gender identity.            Thank you!     Thank you for choosing [facility]  for your care. Our goal is always to provide you with excellent care. Hearing back from our patients is one way we can continue to improve our services. Please take a few minutes to complete the written survey that you may receive in the mail after your visit with us. Thank you!             Your Updated Medication List - Protect others around you: Learn how to safely use, store and throw away your medicines at www.disposemymeds.org.      Notice  As of 5/23/2018  8:35 AM    You have not been prescribed any medications.

## 2018-05-24 NOTE — PROGRESS NOTES
Patient did not return for follow up treatments as directed.  Goal status and current objective information is therefore unknown.  The daily note from the patient's last visit will serve as the discharge note. Discharge from PT services at this time for this episode of treatment. Please see attached documentation under this episode of care for further information including dates of service, start of care date, referring physician, Dx, treatment plan, treatments, etc.    Please contact me with any questions or concerns.  Thank you for your referral.    Naila Mace PT, DPT, OCS  Physical Therapist, Orthopedic Certified Specialist  [facility]  [phone]       PHYSICAL THERAPY DISCHARGE NOTE  03/09/18 1400   Signing Clinician's Name / Credentials   Signing clinician's name / credentials Naila Mace PT, DPT, OCS   Session Number   Session Number 2 PO   Progress Note/Recertification   Progress Note Due Date 03/26/18   Adult Goals   PT Ortho Eval Goals 1;2;3;4   Ortho Goal 1   Goal Identifier 1   Goal Description Patient will report no pain with reaching overhead.   Target Date 03/26/18   Ortho Goal 2   Goal Identifier 2   Goal Description Patient will report no pain with doffing shirts.   Target Date 03/26/18   Ortho Goal 3   Goal Identifier 3   Goal Description Patient will report minimal to no pain with lifting or carrying.   Target Date 03/26/18   Ortho Goal 4   Goal Identifier 4   Goal Description Patient will be independent and compliant with HEP to aid functional recovery.   Target Date 03/26/18   Subjective Report   Subjective Report Thinks the shoulder is improving but also hasn't done a lot to test it. Did hurt quite a bit one day after work. Does feel his strength is improving.   Objective Measures   Objective Measures Objective Measure 1   Objective Measure 1   Objective Measure Shoulder AROM   Details full flexion B but end range pain at AC joint B    Treatment Interventions   Interventions Therapeutic Procedure/Exercise   Therapeutic Procedure/exercise   Minutes 30   Skilled Intervention HEP instruction, flexibility, strength to decrease pain and improve function   Patient Response no pain with exercises, felt good muscle fatigue   Treatment Detail Doorway pec stretch 30' x 2, SLIR 30' x 2 B, prone Ys 3# x 40, prone L-W 3# x 40, instr pt in dynamic hug grey TB for serratus strengthening x 30, reviewed form with gym exercises (pull downs, rowing) and cued patient to ensure shoulders stay depressed with all exercises.    Education   Learner Patient   Readiness Acceptance   Method Booklet/handout;Explanation;Demonstration   Response Verbalizes Understanding;Demonstrates Understanding   Plan   Home program above ex   Updates to plan of care f/u in 2-3 weeks   Plan for next session progress ex    Total Session Time   Timed Code Treatment Minutes 30   Total Treatment Time (sum of timed and untimed services) 30, te2

## 2018-05-24 NOTE — ADDENDUM NOTE
Encounter addended by: Naila Mace, PT on: 5/24/2018  3:23 PM<BR>     Actions taken: Sign clinical note, Flowsheet accepted, Episode resolved

## 2018-06-06 ENCOUNTER — MYC MEDICAL ADVICE (OUTPATIENT)
Dept: ORTHOPEDICS | Facility: CLINIC | Age: 33
End: 2018-06-06

## 2018-06-06 NOTE — TELEPHONE ENCOUNTER
Replied directly to patient.  Asked patient to leave 2-3 times they are available over the next 2-3 days so I can give them a call back.    Cecilia Salazar MD

## 2018-06-07 ENCOUNTER — OFFICE VISIT (OUTPATIENT)
Dept: ORTHOPEDICS | Facility: CLINIC | Age: 33
End: 2018-06-07
Payer: COMMERCIAL

## 2018-06-07 VITALS
WEIGHT: 171 LBS | DIASTOLIC BLOOD PRESSURE: 70 MMHG | BODY MASS INDEX: 25.91 KG/M2 | HEIGHT: 68 IN | SYSTOLIC BLOOD PRESSURE: 118 MMHG

## 2018-06-07 DIAGNOSIS — G89.29 CHRONIC RIGHT SHOULDER PAIN: Primary | ICD-10-CM

## 2018-06-07 DIAGNOSIS — M19.011 ARTHROSIS OF RIGHT ACROMIOCLAVICULAR JOINT: ICD-10-CM

## 2018-06-07 DIAGNOSIS — M25.511 CHRONIC RIGHT SHOULDER PAIN: Primary | ICD-10-CM

## 2018-06-07 PROCEDURE — 99207 ZZC NO CHARGE LOS: CPT | Performed by: FAMILY MEDICINE

## 2018-06-07 PROCEDURE — 20606 DRAIN/INJ JOINT/BURSA W/US: CPT | Performed by: FAMILY MEDICINE

## 2018-06-07 RX ORDER — TRIAMCINOLONE ACETONIDE 40 MG/ML
40 INJECTION, SUSPENSION INTRA-ARTICULAR; INTRAMUSCULAR ONCE
Qty: 1 ML | Refills: 0 | OUTPATIENT
Start: 2018-06-07 | End: 2018-06-07

## 2018-06-07 NOTE — MR AVS SNAPSHOT
"              After Visit Summary   6/7/2018    Sachin Palma    MRN: [id number]           Patient Information     Date Of Birth          1985        Visit Information        Provider Department      6/7/2018 3:00 PM Foster Woodruff DO [place] [facility]        Today's Diagnoses     Chronic right shoulder pain    -  1    Arthrosis of right acromioclavicular joint           Follow-ups after your visit        Who to contact     If you have questions or need follow up information about today's clinic visit or your schedule please contact [facility] directly at [phone].  Normal or non-critical lab and imaging results will be communicated to you by BrainBothart, letter or phone within 4 business days after the clinic has received the results. If you do not hear from us within 7 days, please contact the clinic through BrainBothart or phone. If you have a critical or abnormal lab result, we will notify you by phone as soon as possible.  Submit refill requests through HelpHub or call your pharmacy and they will forward the refill request to us. Please allow 3 business days for your refill to be completed.          Additional Information About Your Visit        MyChart Information     HelpHub gives you secure access to your electronic health record. If you see a primary care provider, you can also send messages to your care team and make appointments. If you have questions, please call your primary care clinic.  If you do not have a primary care provider, please call [phone] and they will assist you.        Care EveryWhere ID     This is your Care EveryWhere ID. This could be used by other organizations to access your [place] medical records  [id number]        Your Vitals Were     Height BMI (Body Mass Index)                5' 8.25\" (1.734 m) 25.81 kg/m2           Blood Pressure from Last 3 Encounters:   06/07/18 118/70   05/23/18 124/86 "   05/09/18 122/68    Weight from Last 3 Encounters:   06/07/18 171 lb (77.6 kg)   05/23/18 170 lb (77.1 kg)   05/09/18 170 lb 14.4 oz (77.5 kg)              We Performed the Following     HC ARTHROCENTESIS ASPIR&/INJ INTERM JT/BURS W/US     TRIAMCINOLONE ACET INJ NOS          Today's Medication Changes          These changes are accurate as of 6/7/18  4:40 PM.  If you have any questions, ask your nurse or doctor.               Start taking these medicines.        Dose/Directions    triamcinolone acetonide 40 MG/ML injection   Commonly known as:  KENALOG-40   Used for:  Chronic right shoulder pain, Arthrosis of right acromioclavicular joint   Started by:  Foster Woodruff DO        Dose:  40 mg   1 mL (40 mg) by INTRA-ARTICULAR route once for 1 dose   Quantity:  1 mL   Refills:  0            Where to get your medicines      Some of these will need a paper prescription and others can be bought over the counter.  Ask your nurse if you have questions.     You don't need a prescription for these medications     triamcinolone acetonide 40 MG/ML injection                Primary Care Provider Fax #    Physician No Ref-Primary [phone]       No address on file        Equal Access to Services     SONIA HUNTER : Medina Daigle, tramaine hall, qamary lou kaalnany arevalo, oz lindquist. So [facility] [phone].    ATENCIÓN: Si habla español, tiene a trevizo disposición servicios gratuitos de asistencia lingüística. Llame al [phone].    We comply with applicable federal civil rights laws and [place] laws. We do not discriminate on the basis of race, color, national origin, age, disability, sex, sexual orientation, or gender identity.            Thank you!     Thank you for choosing [place] [facility]  for your care. Our goal is always to provide you with excellent care. Hearing back from our patients is one way we can continue to improve our services.  Please take a few minutes to complete the written survey that you may receive in the mail after your visit with us. Thank you!             Your Updated Medication List - Protect others around you: Learn how to safely use, store and throw away your medicines at www.disposemymeds.org.          This list is accurate as of 6/7/18  4:40 PM.  Always use your most recent med list.                   Brand Name Dispense Instructions for use Diagnosis    triamcinolone acetonide 40 MG/ML injection    KENALOG-40    1 mL    1 mL (40 mg) by INTRA-ARTICULAR route once for 1 dose    Chronic right shoulder pain, Arthrosis of right acromioclavicular joint

## 2018-06-07 NOTE — LETTER
2018         RE: Sachin Palma  [address] [place] [address]        Dear Colleague,    Thank you for referring your patient, Sachin Palma, to the [facility] [place]. Please see a copy of my visit note below.    Sachin Palma  :  1985  DOS: 2018  MRN: [id number]    Sports Medicine Clinic Procedure    Ultrasound Guided Right Intra-Articular Acromioclavicular Injection    Clinical History: Gradual onset of right shoulder pain with weight training and overhead activity over the last ~ 1 year.  Patient has had only moderate relief with physical therapy.  AC joint arthrosis noted on MRI.    Diagnosis:   1. Chronic right shoulder pain    2. Arthrosis of right acromioclavicular joint      Referring Physician:  Cecilia Salazar MD  Technique: The risks of the procedure were explained to the patient.  A consent was signed for the acromioclavicular joint injection.  The patient was evaluated with a [employer] ultrasound machine using a 12 MHz linear probe.     The Right shoulder was prepped and draped in a sterile manner.  Ultrasound identification of the AC joint in a coronal-oblique plane.  Provocative maneuvers did not cause widening at the AC joint.  A 1.5 inch 22 gauge needle was placed under ultrasound guidance into the AC joint joint.    A mixture of 2 ml 0.2% ropivacaine and 1 ml kenalog (40mg/ml) was injected without difficulty on long axis view.  The needle was removed and there was good hemostasis without complications.  There was ultrasound documentation of needle placement and injection.  Pre-procedural pain 4/10.  Post procedural pain 2-3/10.    Impression:  Successful Right intra-articular acromioclavicular injection.    Plan:  Follow up with Dr Salazar as previously discussed.  Expectations and goals of CSI reviewed  Often 2-3 days for steroid effect, and can take up to two weeks for maximum effect  We discussed modified progressive pain-free  activity as tolerated  Do not overuse in first two weeks if feeling better due to concern for vulnerability while steroid is working  Supportive care reviewed  All questions were answered today  Contact us with additional questions or concerns  Signs and sx of concern reviewed        Foster Woodruff DO, CAQ  Primary Care Sports Medicine  [facility]           Again, thank you for allowing me to participate in the care of your patient.        Sincerely,        Foster Woodruff DO

## 2018-06-07 NOTE — PROGRESS NOTES
Sachin Palma  :  1985  DOS: 2018  MRN: [id number]    Sports Medicine Clinic Procedure    Ultrasound Guided Right Intra-Articular Acromioclavicular Injection    Clinical History: Gradual onset of right shoulder pain with weight training and overhead activity over the last ~ 1 year.  Patient has had only moderate relief with physical therapy.  AC joint arthrosis noted on MRI.    Diagnosis:   1. Chronic right shoulder pain    2. Arthrosis of right acromioclavicular joint      Referring Physician:  Cecilia Salazar MD  Technique: The risks of the procedure were explained to the patient.  A consent was signed for the acromioclavicular joint injection.  The patient was evaluated with a [employer] ultrasound machine using a 12 MHz linear probe.     The Right shoulder was prepped and draped in a sterile manner.  Ultrasound identification of the AC joint in a coronal-oblique plane.  Provocative maneuvers did not cause widening at the AC joint.  A 1.5 inch 22 gauge needle was placed under ultrasound guidance into the AC joint joint.    A mixture of 2 ml 0.2% ropivacaine and 1 ml kenalog (40mg/ml) was injected without difficulty on long axis view.  The needle was removed and there was good hemostasis without complications.  There was ultrasound documentation of needle placement and injection.  Pre-procedural pain 4/10.  Post procedural pain 2-3/10.    Impression:  Successful Right intra-articular acromioclavicular injection.    Plan:  Follow up with Dr Salazar as previously discussed.  Expectations and goals of CSI reviewed  Often 2-3 days for steroid effect, and can take up to two weeks for maximum effect  We discussed modified progressive pain-free activity as tolerated  Do not overuse in first two weeks if feeling better due to concern for vulnerability while steroid is working  Supportive care reviewed  All questions were answered today  Contact us with additional questions or concerns  Signs and sx of  concern reviewed        Foster Woodruff DO, CAQ  Primary Care Sports Medicine  [facility]

## 2018-06-09 NOTE — TELEPHONE ENCOUNTER
Discussed with patient.  - Feeling fairly good after AC injection, will monitor symptoms.  - Discussed possible further options including GH injection (likely diagnostic), consideration of PRP, surgical referral.    Patient will keep us updated on progress    Cecilia Salazar MD

## 2018-09-11 ENCOUNTER — MYC MEDICAL ADVICE (OUTPATIENT)
Dept: ORTHOPEDICS | Facility: CLINIC | Age: 33
End: 2018-09-11

## 2018-09-18 NOTE — TELEPHONE ENCOUNTER
Please contact patient    We had previously discussed possible further options including glenohumeral steroid injection (likely diagnostic), consideration of PRP or surgical referral.  - PRP is not covered by insurance    Cecilia Salazar MD

## 2018-09-18 NOTE — TELEPHONE ENCOUNTER
Contacted patient and discuss PRP injections. He would like to do more research on this type of injection, but did not remember the name. He had no further question for us at this time.    Jeff Harrington, ATC

## 2018-10-10 ENCOUNTER — OFFICE VISIT (OUTPATIENT)
Dept: FAMILY MEDICINE | Facility: CLINIC | Age: 33
End: 2018-10-10
Payer: COMMERCIAL

## 2018-10-10 VITALS
SYSTOLIC BLOOD PRESSURE: 124 MMHG | WEIGHT: 170.4 LBS | DIASTOLIC BLOOD PRESSURE: 74 MMHG | TEMPERATURE: 98.1 F | BODY MASS INDEX: 25.82 KG/M2 | HEIGHT: 68 IN | OXYGEN SATURATION: 99 % | HEART RATE: 80 BPM

## 2018-10-10 DIAGNOSIS — S39.011A STRAIN OF ABDOMINAL MUSCLE, INITIAL ENCOUNTER: Primary | ICD-10-CM

## 2018-10-10 DIAGNOSIS — Z23 NEED FOR PROPHYLACTIC VACCINATION AND INOCULATION AGAINST INFLUENZA: ICD-10-CM

## 2018-10-10 DIAGNOSIS — B35.6 TINEA CRURIS: ICD-10-CM

## 2018-10-10 PROCEDURE — 99213 OFFICE O/P EST LOW 20 MIN: CPT | Mod: 25 | Performed by: FAMILY MEDICINE

## 2018-10-10 PROCEDURE — 90686 IIV4 VACC NO PRSV 0.5 ML IM: CPT | Performed by: FAMILY MEDICINE

## 2018-10-10 PROCEDURE — 90471 IMMUNIZATION ADMIN: CPT | Performed by: FAMILY MEDICINE

## 2018-10-10 NOTE — PROGRESS NOTES
"  SUBJECTIVE:   Sachin Palma is a 33 year old male who presents to clinic today for the following health issues:      GROIN PAIN      Duration: 2 weeks ago.    Description (location/character/radiation): Groin-lower abdominal pain, bilateral.  Left side is worse.  Right side is tender.    Intensity:  mild    Accompanying signs and symptoms: No fever or chills. No dysuria or hematuria.     History (similar episodes/previous evaluation): History of a double hernia surgery as a child per his mother.  States he was born premature.  History of issues with his testicle.    Precipitating or alleviating factors: When the pain was at its worst it would be worse when getting out of bed.  Can still feel the symptoms when doing abdominal exercises.  When brining the lower body up.    Therapies tried and outcome: Has tried to limit his exercise.       No current outpatient prescriptions on file.    Patient Active Problem List   Diagnosis     AC joint arthropathy     Impingement syndrome, shoulder, right       Blood pressure 124/74, pulse 80, temperature 98.1  F (36.7  C), temperature source Tympanic, height 5' 8.25\" (1.734 m), weight 170 lb 6.4 oz (77.3 kg), SpO2 99 %.    Exam:  GENERAL APPEARANCE: healthy, alert and no distress  ABDOMEN:  soft, nontender, no HSM or masses and bowel sounds normal  GU_male: testicles normal without atrophy or masses, no hernias and penis normal without urethral discharge  SKIN: tinea noted in the groin      (S39.011A) Strain of abdominal muscle, initial encounter  (primary encounter diagnosis)  Comment:   Plan: we discussed the strain of the muscles and to avoid reinjury by heavy lifting and exercises.   Monitor for a bulge or a hernia as discussed and recheck if needed. This will take 4-6 weeks to heal.     (B35.6) Tinea cruris  Comment:   Plan: keep the area dry and use the OTC yeast cream as discussed.       Dmitry Walden          "

## 2018-10-10 NOTE — PATIENT INSTRUCTIONS
Thank you for choosing [facility].  You may be receiving a survey in the mail from [facility] regarding your visit today.  Please take a few minutes to complete and return the survey to let us know how we are doing.      If you have questions or concerns, please contact us via [employer] or you can contact your care team at [phone].    Our Clinic hours are:  Monday 6:40 am  to 7:00 pm  Tuesday -Friday 6:40 am to 5:00 pm    The [place] outpatient lab hours are:  Monday - Friday 6:10 am to 4:45 pm  Saturdays 7:00 am to 11:00 am  Appointments are required, call [phone]    If you have clinical questions after hours or would like to schedule an appointment,  call the clinic at [phone].      (S39.011A) Strain of abdominal muscle, initial encounter  (primary encounter diagnosis)  Comment:   Plan: we discussed the strain of the muscles and to avoid reinjury by heavy lifting and exercises.   Monitor for a bulge or a hernia as discussed and recheck if needed. This will take 4-6 weeks to heal.     (B35.6) Tinea cruris  Comment:   Plan: keep the area dry and use the OTC yeast cream as discussed.

## 2018-10-10 NOTE — MR AVS SNAPSHOT
After Visit Summary   10/10/2018    Sachin Palma    MRN: [id number]           Patient Information     Date Of Birth          1985        Visit Information        Provider Department      10/10/2018 7:20 AM Dmitry Walden MD [facility]        Today's Diagnoses     Strain of abdominal muscle, initial encounter    -  1    Tinea cruris          Care Instructions          Thank you for choosing [facility].  You may be receiving a survey in the mail from [facility] regarding your visit today.  Please take a few minutes to complete and return the survey to let us know how we are doing.      If you have questions or concerns, please contact us via [employer] or you can contact your care team at [phone].    Our Clinic hours are:  Monday 6:40 am  to 7:00 pm  Tuesday -Friday 6:40 am to 5:00 pm    The [place] outpatient lab hours are:  Monday - Friday 6:10 am to 4:45 pm  Saturdays 7:00 am to 11:00 am  Appointments are required, call [phone]    If you have clinical questions after hours or would like to schedule an appointment,  call the clinic at [phone].      (S39.011A) Strain of abdominal muscle, initial encounter  (primary encounter diagnosis)  Comment:   Plan: we discussed the strain of the muscles and to avoid reinjury by heavy lifting and exercises.   Monitor for a bulge or a hernia as discussed and recheck if needed. This will take 4-6 weeks to heal.     (B35.6) Tinea cruris  Comment:   Plan: keep the area dry and use the OTC yeast cream as discussed.           Follow-ups after your visit        Who to contact     If you have questions or need follow up information about today's clinic visit or your schedule please contact [facility] directly at [phone].  Normal or non-critical lab and imaging results will be communicated to you by MyChart, letter or phone within 4 business days after the clinic has received the results. If you do not  "hear from us within 7 days, please contact the clinic through SendTask or phone. If you have a critical or abnormal lab result, we will notify you by phone as soon as possible.  Submit refill requests through SendTask or call your pharmacy and they will forward the refill request to us. Please allow 3 business days for your refill to be completed.          Additional Information About Your Visit        HandUp PBChart Information     SendTask gives you secure access to your electronic health record. If you see a primary care provider, you can also send messages to your care team and make appointments. If you have questions, please call your primary care clinic.  If you do not have a primary care provider, please call [phone] and they will assist you.        Care EveryWhere ID     This is your Care EveryWhere ID. This could be used by other organizations to access your [place] medical records  [id number]        Your Vitals Were     Pulse Temperature Height Pulse Oximetry BMI (Body Mass Index)       80 98.1  F (36.7  C) (Tympanic) 5' 8.25\" (1.734 m) 99% 25.72 kg/m2        Blood Pressure from Last 3 Encounters:   10/10/18 124/74   06/07/18 118/70   05/23/18 124/86    Weight from Last 3 Encounters:   10/10/18 170 lb 6.4 oz (77.3 kg)   06/07/18 171 lb (77.6 kg)   05/23/18 170 lb (77.1 kg)              Today, you had the following     No orders found for display       Primary Care Provider Fax #    Physician No Ref-Primary [phone]       No address on file        Equal Access to Services     ANTONIO HUNTER : Hadii aad ku hadasho Soomaali, waaxda luqadaha, qaybta kaalmada adeegyada, oz solano . So [facility] [phone].    ATENCIÓN: Si habla español, tiene a trevizo disposición servicios gratuitos de asistencia lingüística. Llame al [phone].    We comply with applicable federal civil rights laws and [place] laws. We do not discriminate on the basis of race, color, national origin, age, " disability, sex, sexual orientation, or gender identity.            Thank you!     Thank you for choosing [facility]  for your care. Our goal is always to provide you with excellent care. Hearing back from our patients is one way we can continue to improve our services. Please take a few minutes to complete the written survey that you may receive in the mail after your visit with us. Thank you!             Your Updated Medication List - Protect others around you: Learn how to safely use, store and throw away your medicines at www.disposemymeds.org.      Notice  As of 10/10/2018  8:01 AM    You have not been prescribed any medications.

## 2018-10-10 NOTE — PROGRESS NOTES
Injectable Influenza Immunization Documentation    1.  Is the person to be vaccinated sick today?   No    2. Does the person to be vaccinated have an allergy to a component   of the vaccine?   No  Egg Allergy Algorithm Link    3. Has the person to be vaccinated ever had a serious reaction   to influenza vaccine in the past?   No    4. Has the person to be vaccinated ever had Guillain-Barré syndrome?   No    Form completed by Melida Gold CMA ([facility]) 8:08 AM 10/10/2018

## 2019-11-02 ENCOUNTER — HEALTH MAINTENANCE LETTER (OUTPATIENT)
Age: 34
End: 2019-11-02

## 2019-11-06 ENCOUNTER — OFFICE VISIT (OUTPATIENT)
Dept: FAMILY MEDICINE | Facility: CLINIC | Age: 34
End: 2019-11-06
Payer: COMMERCIAL

## 2019-11-06 VITALS
TEMPERATURE: 97.9 F | WEIGHT: 169 LBS | OXYGEN SATURATION: 99 % | HEIGHT: 68 IN | BODY MASS INDEX: 25.61 KG/M2 | HEART RATE: 73 BPM | DIASTOLIC BLOOD PRESSURE: 78 MMHG | RESPIRATION RATE: 16 BRPM | SYSTOLIC BLOOD PRESSURE: 128 MMHG

## 2019-11-06 DIAGNOSIS — Z00.00 ROUTINE GENERAL MEDICAL EXAMINATION AT A HEALTH CARE FACILITY: Primary | ICD-10-CM

## 2019-11-06 DIAGNOSIS — Z23 ENCOUNTER FOR IMMUNIZATION: ICD-10-CM

## 2019-11-06 DIAGNOSIS — Z23 NEED FOR PROPHYLACTIC VACCINATION AND INOCULATION AGAINST INFLUENZA: ICD-10-CM

## 2019-11-06 PROCEDURE — 90715 TDAP VACCINE 7 YRS/> IM: CPT | Performed by: FAMILY MEDICINE

## 2019-11-06 PROCEDURE — 90686 IIV4 VACC NO PRSV 0.5 ML IM: CPT | Performed by: FAMILY MEDICINE

## 2019-11-06 PROCEDURE — 90471 IMMUNIZATION ADMIN: CPT | Performed by: FAMILY MEDICINE

## 2019-11-06 PROCEDURE — 99395 PREV VISIT EST AGE 18-39: CPT | Mod: 25 | Performed by: FAMILY MEDICINE

## 2019-11-06 PROCEDURE — 90472 IMMUNIZATION ADMIN EACH ADD: CPT | Performed by: FAMILY MEDICINE

## 2019-11-06 ASSESSMENT — MIFFLIN-ST. JEOR: SCORE: 1663.14

## 2019-11-06 NOTE — PATIENT INSTRUCTIONS
Preventive Health Recommendations  Male Ages 26 - 39    Yearly exam:             See your health care provider every year in order to  o   Review health changes.   o   Discuss preventive care.    o   Review your medicines if your doctor has prescribed any.    You should be tested each year for STDs (sexually transmitted diseases), if you re at risk.     After age 35, talk to your provider about cholesterol testing. If you are at risk for heart disease, have your cholesterol tested at least every 5 years.     If you are at risk for diabetes, you should have a diabetes test (fasting glucose).  Shots: Get a flu shot each year. Get a tetanus shot every 10 years.     Nutrition:    Eat at least 5 servings of fruits and vegetables daily.     Eat whole-grain bread, whole-wheat pasta and brown rice instead of white grains and rice.     Get adequate Calcium and Vitamin D.     Lifestyle    Exercise for at least 150 minutes a week (30 minutes a day, 5 days a week). This will help you control your weight and prevent disease.     Limit alcohol to one drink per day.     No smoking.     Wear sunscreen to prevent skin cancer.     See your dentist every six months for an exam and cleaning.           Thank you for choosing [facility].  You may be receiving an email and/or telephone survey request from [facility] regarding your visit today.  Please take a few minutes to respond to the survey to let us know how we are doing.      If you have questions or concerns, please contact us via [employer] or you can contact your care team at [phone].    Our Clinic hours are:  Monday 6:40 am  to 7:00 pm  Tuesday -Friday 6:40 am to 5:00 pm    The [place] outpatient lab hours are:  Monday - Friday 6:10 am to 4:45 pm  Saturdays 7:00 am to 11:00 am  Appointments are required, call [phone]    If you have clinical questions after hours or would like to schedule an appointment,  call the clinic at  "[phone].    ASSESSMENT/PLAN:   1. Routine general medical examination at a health care facility  COUNSELING:  Reviewed preventive health counseling, as reflected in patient instructions       Regular exercise       Healthy diet/nutrition       Vision screening       Hearing screening  Estimated body mass index is 26.08 kg/m  as calculated from the following:    Height as of this encounter: 1.715 m (5' 7.5\").    Weight as of this encounter: 76.7 kg (169 lb).     reports that he has never smoked. He has never used smokeless tobacco.  Counseling Resources:  ATP IV Guidelines  Pooled Cohorts Equation Calculator  FRAX Risk Assessment  ICSI Preventive Guidelines  Dietary Guidelines for Americans, 2010  USDA's MyPlate  ASA Prophylaxis  Lung CA Screening  No labs are needed.       "

## 2019-11-06 NOTE — PROGRESS NOTES
3  SUBJECTIVE:   CC: Sachin Palma is an 34 year old male who presents for preventive health visit.     Healthy Habits:    Do you get at least three servings of calcium containing foods daily (dairy, green leafy vegetables, etc.)? yes    Amount of exercise or daily activities, outside of work: 3-4 day(s) per week    Problems taking medications regularly not applicable    Medication side effects: No    Have you had an eye exam in the past two years? Not for an annual eye exam.  He did have metal in the eye and was seen for that issue.    Do you see a dentist twice per year? Not in the last year.    Do you have sleep apnea, excessive snoring or daytime drowsiness?no      Chief Complaint   Patient presents with     Physical     General physical exam.  Discuss if he is due for any labs.     Imm/Inj     Update Adacel injection.     Flu Shot     Flu shot today.     Today's PHQ-2 Score:   PHQ-2 ( 1999 Pfizer) 11/6/2019 10/10/2018   Q1: Little interest or pleasure in doing things 0 0   Q2: Feeling down, depressed or hopeless 0 0   PHQ-2 Score 0 0       Abuse: Current or Past(Physical, Sexual or Emotional)- No  Do you feel safe in your environment? Yes    No current outpatient medications on file.    Patient Active Problem List   Diagnosis     AC joint arthropathy     Impingement syndrome, shoulder, right     Social History     Tobacco Use     Smoking status: Never Smoker     Smokeless tobacco: Never Used   Substance Use Topics     Alcohol use: Yes     Comment: Once or twice per day.     If you drink alcohol do you typically have >3 drinks per day or >7 drinks per week? No                      Last PSA: No results found for: PSA    Reviewed orders with patient. Reviewed health maintenance and updated orders accordingly - Yes      Reviewed and updated as needed this visit by clinical staff  Tobacco  Allergies  Meds  Med Hx  Surg Hx  Fam Hx  Soc Hx        Reviewed and updated as needed this visit by  "Provider    ROS:  CONSTITUTIONAL: NEGATIVE for fever, chills, change in weight  INTEGUMENTARY/SKIN: NEGATIVE for worrisome rashes, moles or lesions  EYES: NEGATIVE for vision changes or irritation  ENT: NEGATIVE for ear, mouth and throat problems  RESP: NEGATIVE for significant cough or SOB  CV: NEGATIVE for chest pain, palpitations or peripheral edema  GI: NEGATIVE for nausea, abdominal pain, heartburn, or change in bowel habits   male: negative for dysuria, hematuria, decreased urinary stream, erectile dysfunction, urethral discharge  MUSCULOSKELETAL: NEGATIVE for significant arthralgias or myalgia  NEURO: NEGATIVE for weakness, dizziness or paresthesias  PSYCHIATRIC: NEGATIVE for changes in mood or affect    OBJECTIVE:   /78   Pulse 73   Temp 97.9  F (36.6  C) (Tympanic)   Resp 16   Ht 1.715 m (5' 7.5\")   Wt 76.7 kg (169 lb)   SpO2 99%   BMI 26.08 kg/m    EXAM:  GENERAL APPEARANCE: healthy, alert and no distress  EYES: EOMI,  PERRL  HENT: ear canals and TM's normal and nose and mouth without ulcers or lesions  NECK: no adenopathy, no asymmetry, masses, or scars and thyroid normal to palpation  RESP: lungs clear to auscultation - no rales, rhonchi or wheezes  CV: regular rates and rhythm, normal S1 S2, no S3 or S4 and no murmur, click or rub -  ABDOMEN:  soft, nontender, no HSM or masses and bowel sounds normal  GU_male: testicles normal without atrophy or masses, no hernias and penis normal without urethral discharge  MS: extremities normal- no gross deformities noted, no evidence of inflammation in joints, FROM in all extremities.  SKIN: no suspicious lesions or rashes  NEURO: Normal strength and tone, sensory exam grossly normal, mentation intact and speech normal  PSYCH: mentation appears normal and affect normal/bright    ASSESSMENT/PLAN:   1. Routine general medical examination at a health care facility  COUNSELING:  Reviewed preventive health counseling, as reflected in patient instructions   " "    Regular exercise       Healthy diet/nutrition       Vision screening       Hearing screening  Estimated body mass index is 26.08 kg/m  as calculated from the following:    Height as of this encounter: 1.715 m (5' 7.5\").    Weight as of this encounter: 76.7 kg (169 lb).     reports that he has never smoked. He has never used smokeless tobacco.  Counseling Resources:  ATP IV Guidelines  Pooled Cohorts Equation Calculator  FRAX Risk Assessment  ICSI Preventive Guidelines  Dietary Guidelines for Americans, 2010  USDA's MyPlate  ASA Prophylaxis  Lung CA Screening  No labs are needed.     Dmitry Walden MD  [facility]  "

## 2019-11-06 NOTE — NURSING NOTE
Prior to immunization administration, verified patients identity using patient s name and date of birth. Please see Immunization Activity for additional information.     Screening Questionnaire for Adult Immunization    Are you sick today?   No   Do you have allergies to medications, food, a vaccine component or latex?   No   Have you ever had a serious reaction after receiving a vaccination?   No   Do you have a long-term health problem with heart disease, lung disease, asthma, kidney disease, metabolic disease (e.g. diabetes), anemia, or other blood disorder?   No   Do you have cancer, leukemia, HIV/AIDS, or any other immune system problem?   No   In the past 3 months, have you taken medications that affect  your immune system, such as prednisone, other steroids, or anticancer drugs; drugs for the treatment of rheumatoid arthritis, Crohn s disease, or psoriasis; or have you had radiation treatments?   No   Have you had a seizure, or a brain or other nervous system problem?   No   During the past year, have you received a transfusion of blood or blood     products, or been given immune (gamma) globulin or antiviral drug?   No   For women: Are you pregnant or is there a chance you could become        pregnant during the next month?   No   Have you received any vaccinations in the past 4 weeks?   No     Immunization questionnaire answers were all negative.        Per orders of Dr. Walden, injection of Adacel and Flu given by Cecilia Huerta CMA. Patient instructed to remain in clinic for 15 minutes afterwards, and to report any adverse reaction to me immediately.       Screening performed by Cecilia Huerta CMA on 11/6/2019 at 3:56 PM.

## 2020-11-16 ENCOUNTER — HEALTH MAINTENANCE LETTER (OUTPATIENT)
Age: 35
End: 2020-11-16

## 2021-01-15 ENCOUNTER — HEALTH MAINTENANCE LETTER (OUTPATIENT)
Age: 36
End: 2021-01-15

## 2021-08-18 ASSESSMENT — ENCOUNTER SYMPTOMS
COUGH: 0
HEADACHES: 0
FEVER: 0
PALPITATIONS: 0
EYE PAIN: 0
FREQUENCY: 0
HEMATURIA: 0
DIARRHEA: 0
DYSURIA: 0
NAUSEA: 0
CHILLS: 0
CONSTIPATION: 0
WEAKNESS: 0
JOINT SWELLING: 0
ABDOMINAL PAIN: 0
DIZZINESS: 0
NERVOUS/ANXIOUS: 0
HEMATOCHEZIA: 0
ARTHRALGIAS: 1
SHORTNESS OF BREATH: 0
HEARTBURN: 0
MYALGIAS: 1
PARESTHESIAS: 0
SORE THROAT: 0

## 2021-08-19 ENCOUNTER — OFFICE VISIT (OUTPATIENT)
Dept: FAMILY MEDICINE | Facility: CLINIC | Age: 36
End: 2021-08-19
Payer: COMMERCIAL

## 2021-08-19 VITALS
BODY MASS INDEX: 26.25 KG/M2 | WEIGHT: 173.2 LBS | OXYGEN SATURATION: 99 % | TEMPERATURE: 98.3 F | RESPIRATION RATE: 12 BRPM | DIASTOLIC BLOOD PRESSURE: 84 MMHG | HEIGHT: 68 IN | HEART RATE: 82 BPM | SYSTOLIC BLOOD PRESSURE: 136 MMHG

## 2021-08-19 DIAGNOSIS — S39.012A BACK STRAIN, INITIAL ENCOUNTER: ICD-10-CM

## 2021-08-19 DIAGNOSIS — Z00.00 ROUTINE GENERAL MEDICAL EXAMINATION AT A HEALTH CARE FACILITY: Primary | ICD-10-CM

## 2021-08-19 DIAGNOSIS — Z13.220 LIPID SCREENING: ICD-10-CM

## 2021-08-19 PROCEDURE — 99395 PREV VISIT EST AGE 18-39: CPT | Performed by: FAMILY MEDICINE

## 2021-08-19 PROCEDURE — 99213 OFFICE O/P EST LOW 20 MIN: CPT | Mod: 25 | Performed by: FAMILY MEDICINE

## 2021-08-19 ASSESSMENT — ENCOUNTER SYMPTOMS
ABDOMINAL PAIN: 0
FREQUENCY: 0
FEVER: 0
SHORTNESS OF BREATH: 0
HEADACHES: 0
PARESTHESIAS: 0
WEAKNESS: 0
DIARRHEA: 0
MYALGIAS: 1
CHILLS: 0
SORE THROAT: 0
HEARTBURN: 0
DYSURIA: 0
NAUSEA: 0
NERVOUS/ANXIOUS: 0
COUGH: 0
ARTHRALGIAS: 1
HEMATURIA: 0
DIZZINESS: 0
PALPITATIONS: 0
HEMATOCHEZIA: 0
EYE PAIN: 0
CONSTIPATION: 0
JOINT SWELLING: 0

## 2021-08-19 ASSESSMENT — MIFFLIN-ST. JEOR: SCORE: 1680.13

## 2021-08-19 NOTE — PROGRESS NOTES
"SUBJECTIVE:   CC: Sachin Palma is an 36 year old male who presents for preventative health visit.       Patient has been advised of split billing requirements and indicates understanding: Yes  Healthy Habits:     Getting at least 3 servings of Calcium per day:  Yes    Bi-annual eye exam:  Yes    Dental care twice a year:  NO    Sleep apnea or symptoms of sleep apnea:  None    Diet:  Regular (no restrictions)    Frequency of exercise:  2-3 days/week    Duration of exercise:  Greater than 60 minutes    Taking medications regularly:  Yes    Medication side effects:  None    PHQ-2 Total Score: 0    Additional concerns today:  Yes          Back Pain   \"Been having pain in left lower back, tried icing, tens, stretching,dosent seeem to be getting better. Thinking it could be the sacroiliac joint.\"      Duration: 1 week. Putting weigh on left side and bending over and when shifting positions where weight is put on left side.        Specific cause: none. Went to [facility] last week. Saw a chiropractor after    Description:   Location of pain:Left lower back  Character of pain: sharp and dull ache   Pain radiation:Left hip  New numbness or weakness in legs, not attributed to pain:  no . Will have to brace hisself when bending over.    Intensity: Currently 3/10, At its worst 10/10    History:   Pain interferes with job: YES, Has to move slower and brace hisself when bending over  History of back problems: no prior back problems  Any previous MRI or X-rays: None  Sees a specialist for back pain:  No  Therapies tried without relief: Chiropractic care, tens unit, stretches, yoga poses, aleve    Alleviating factors:   Improved by: Pressure massage for short periods of time.      Precipitating factors:  Worsened by: Bending and Weightbearing on left side  Also feels very stiff when he is laying in bed overnight.  Patient is an .  Denies trauma.      Accompanying Signs & Symptoms:  Risk of Fracture:  None  Risk of " Cauda Equina:  None  Risk of Infection:  None  Risk of Cancer:  None  Risk of Ankylosing Spondylitis:  Onset at age <35, male, AND morning back stiffness. no       Today's PHQ-2 Score:   PHQ-2 ( 1999 Pfizer) 8/18/2021   Q1: Little interest or pleasure in doing things 0   Q2: Feeling down, depressed or hopeless 0   PHQ-2 Score 0   Q1: Little interest or pleasure in doing things Not at all   Q2: Feeling down, depressed or hopeless Not at all   PHQ-2 Score 0       Abuse: Current or Past (Physical, Sexual or Emotional)- No  Do you feel safe in your environment? Yes    Have you ever done Advance Care Planning? (For example, a Health Directive, POLST, or a discussion with a medical provider or your loved ones about your wishes): No, advance care planning information given to patient to review.  Advanced care planning was discussed at today's visit.    Social History     Tobacco Use     Smoking status: Never Smoker     Smokeless tobacco: Never Used   Substance Use Topics     Alcohol use: Yes     Comment: Once or twice per day.           AUDIT - Alcohol Use Disorders Identification Test - Reproduced from the World Health Organization Audit 2001 (Second Edition) 8/18/2021   1.  How often do you have a drink containing alcohol? 2 to 3 times a week   2.  How many drinks containing alcohol do you have on a typical day when you are drinking? 3 or 4   3.  How often do you have five or more drinks on one occasion? Less than monthly   4.  How often during the last year have you found that you were not able to stop drinking once you had started? Never   5.  How often during the last year have you failed to do what was normally expected of you because of drinking? Never   6.  How often during the last year have you needed a first drink in the morning to get yourself going after a heavy drinking session? Never   7.  How often during the last year have you had a feeling of guilt or remorse after drinking? Never   8.  How often during  the last year have you been unable to remember what happened the night before because of your drinking? Never   9.  Have you or someone else been injured because of your drinking? No   10. Has a relative, friend, doctor or other health care worker been concerned about your drinking or suggested you cut down? No   TOTAL SCORE 5       Last PSA: No results found for: PSA    Reviewed orders with patient. Reviewed health maintenance and updated orders accordingly - Yes  Patient Active Problem List   Diagnosis     AC joint arthropathy     Impingement syndrome, shoulder, right     History reviewed. No pertinent surgical history.    Social History     Tobacco Use     Smoking status: Never Smoker     Smokeless tobacco: Never Used   Substance Use Topics     Alcohol use: Yes     Comment: Once or twice per day.     Family History   Problem Relation Age of Onset     Hypertension Mother      Other - See Comments Father         Stroke     Hyperlipidemia Father          No current outpatient medications on file.     No Known Allergies    Reviewed and updated as needed this visit by clinical staff  Tobacco  Allergies  Meds   Med Hx  Surg Hx  Fam Hx  Soc Hx        Reviewed and updated as needed this visit by Provider                History reviewed. No pertinent past medical history.   History reviewed. No pertinent surgical history.    Review of Systems   Constitutional: Negative for chills and fever.   HENT: Negative for congestion, ear pain, hearing loss and sore throat.    Eyes: Negative for pain and visual disturbance.   Respiratory: Negative for cough and shortness of breath.    Cardiovascular: Negative for chest pain, palpitations and peripheral edema.   Gastrointestinal: Negative for abdominal pain, constipation, diarrhea, heartburn, hematochezia and nausea.   Genitourinary: Negative for discharge, dysuria, frequency, genital sores, hematuria, impotence and urgency.   Musculoskeletal: Positive for arthralgias and  "myalgias. Negative for joint swelling.   Skin: Negative for rash.   Neurological: Negative for dizziness, weakness, headaches and paresthesias.   Psychiatric/Behavioral: Negative for mood changes. The patient is not nervous/anxious.        OBJECTIVE:   /84   Pulse 82   Temp 98.3  F (36.8  C) (Tympanic)   Resp 12   Ht 1.727 m (5' 8\")   Wt 78.6 kg (173 lb 3.2 oz)   SpO2 99%   BMI 26.33 kg/m      Physical Exam  GENERAL APPEARANCE: healthy, alert and no distress  EYES: pink conj, no icterus, PERRL, EOMI  HENT: ear canals and TM's normal, nose and mouth without ulcers or lesions, oropharynx clear and oral mucous membranes moist  NECK: no adenopathy, no asymmetry, masses, or scars and thyroid normal to palpation  RESP: lungs clear to auscultation - no rales, rhonchi or wheezes  CV: regular rates and rhythm, normal S1 S2, no S3 or S4, no murmur, click or rub, no peripheral edema and peripheral pulses strong  ABDOMEN: soft, nontender, no hepatosplenomegaly, no masses and bowel sounds normal  RECTAL: normal sphincter tone, no rectal masses, prostate slightly enlarged but smooth, soft and nontender  MS: no musculoskeletal defects are noted and gait is age appropriate without ataxia  SKIN: no suspicious lesions or rashes  NEURO: Normal strength and tone, sensory exam grossly normal, mentation intact and speech normal  BACK: no gross deformity, normal curvature, no TTP, full range of motion, SLR negative bilaterally  Diagnostic Test Results:  none     ASSESSMENT/PLAN:   Sachin was seen today for physical and back pain.    Diagnoses and all orders for this visit:    Routine general medical examination at a health care facility    Back strain, initial encounter  -     OFFICE/OUTPT VISIT,EST,LEVL III  No red flags on exam.  Discussed usual course and treatment of strains/spasms.  Safe naproxen use advised.  Offered small dose of muscle relaxant for prn use for significant stiffnes or spasms. Patient " "deferred.  Consider physical therapy.  No clear indication for imaging.  Symptomatic measures discussed in detail as in AVS.  Precautions given.    Lipid screening  -     Lipid panel reflex to direct LDL Fasting; Future    Patient deferred HIV and hepatitis C screening.    Patient has been advised of split billing requirements and indicates understanding: Yes  COUNSELING:   Reviewed preventive health counseling, as reflected in patient instructions    Estimated body mass index is 26.33 kg/m  as calculated from the following:    Height as of this encounter: 1.727 m (5' 8\").    Weight as of this encounter: 78.6 kg (173 lb 3.2 oz).     Weight management plan: Discussed healthy diet and exercise guidelines    He reports that he has never smoked. He has never used smokeless tobacco.      Counseling Resources:  ATP IV Guidelines  Pooled Cohorts Equation Calculator  FRAX Risk Assessment  ICSI Preventive Guidelines  Dietary Guidelines for Americans, 2010  USDA's MyPlate  ASA Prophylaxis  Lung CA Screening    Norm Cueva MD  [facility]  "

## 2021-08-19 NOTE — NURSING NOTE
"Initial /84   Pulse 82   Temp 98.3  F (36.8  C) (Tympanic)   Resp 12   Ht 1.727 m (5' 8\")   Wt 78.6 kg (173 lb 3.2 oz)   SpO2 99%   BMI 26.33 kg/m   Estimated body mass index is 26.33 kg/m  as calculated from the following:    Height as of this encounter: 1.727 m (5' 8\").    Weight as of this encounter: 78.6 kg (173 lb 3.2 oz). .      "

## 2021-08-19 NOTE — PATIENT INSTRUCTIONS
May take Naproxen 220 mg orally with food every 12 hrs as needed for pain.    Try diclofenac gel per 's instructions topically on the painful area of the back as needed for pain.    Preventative Care Visits include: Yearly physicals, Well-child visits, Welcome to Medicare visits, & Medicare yearly wellness exams.    The purpose of these visits is to discuss your medical history and prevent health problems before you are sick.  You may need to pay a copay, coinsurance or deductible if your visit today includes testing or treating for a new or existing condition.    Additional charges may be incurred for today's visit. If you have questions about what your insurance plan covers, please contact your Insurance Company's member service department.  If you have questions specific to a bill you have already received from [employer], please contact the [facility] Office at [phone].     Preventive Health Recommendations  Male Ages 26 - 39    Yearly exam:             See your health care provider every year in order to  o   Review health changes.   o   Discuss preventive care.    o   Review your medicines if your doctor has prescribed any.    You should be tested each year for STDs (sexually transmitted diseases), if you re at risk.     After age 35, talk to your provider about cholesterol testing. If you are at risk for heart disease, have your cholesterol tested at least every 5 years.     If you are at risk for diabetes, you should have a diabetes test (fasting glucose).  Shots: Get a flu shot each year. Get a tetanus shot every 10 years.     Nutrition:    Eat at least 5 servings of fruits and vegetables daily.     Eat whole-grain bread, whole-wheat pasta and brown rice instead of white grains and rice.     Get adequate Calcium and Vitamin D.     Lifestyle    Exercise for at least 150 minutes a week (30 minutes a day, 5 days a week). This will help you control your weight and prevent disease.      Limit alcohol to one drink per day.     No smoking.     Wear sunscreen to prevent skin cancer.     See your dentist every six months for an exam and cleaning.     Patient Education     Back Sprain or Strain     Injury to the muscles (strain) or ligaments (sprain) around the spine can be troubling. Injury may occur after a sudden forceful twisting or bending such as in a car accident, after a simple awkward movement, or after lifting something heavy with poor body positioning. In any case, muscle spasm is often present and adds to the pain.  Thankfully, most people feel better in 1 to 2 weeks. Most of the rest feel better in 1 to 2 months. Most people can remain active. Unless you had a forceful or traumatic physical injury such as a car accident or fall, X-rays may not be done for the first assessment of a back sprain or strain. If pain continues and doesn't respond to medical treatment, your healthcare provider may then do X-rays and other tests.  Home care  These guidelines will help you care for your injury at home:    When in bed, try to find a comfortable position. A firm mattress is best. Try lying flat on your back with pillows under your knees. You can also try lying on your side with your knees bent up toward your chest and a pillow between your knees.    Don't sit for long periods. Try not to take long car rides or take other trips that have you sitting for a long time. This puts more stress on the lower back than standing or walking.    During the first 24 to 72 hours after an injury or flare-up, put an ice pack on the painful area for 20 minutes. Then remove it for 20 minutes. Do this for 60 to 90 minutes, or several times a day. This will reduce swelling and pain. Always wrap the ice pack in a thin towel or plastic to protect your skin.    You can start with ice, then switch to heat. Heat from a hot shower, hot bath, or heating pad reduces pain and works well for muscle spasms. Put heat on the  painful area for 20 minutes, then remove for 20 minutes. Do this for 60 to 90 minutes, or several times a day. Don't use a heating pad while sleeping. It can burn the skin.    You can alternate the ice and heat. Talk with your healthcare provider to find out the best treatment or therapy for your back pain.    Therapeutic massage can help relax the back muscles without stretching them.    Be aware of safe lifting methods. Don't lift anything over 15 pounds until all of the pain is gone.  Medicines  Talk with your healthcare provider before using medicines, especially if you have other health problems or are taking other medicines.    You may use over-the-counter medicines such as acetaminophen, ibuprofen, or naproxen to control pain, unless another pain medicine was prescribed. Talk with your healthcare provider before taking any medicines if you have a chronic condition such as diabetes, liver or kidney disease, stomach ulcers, or digestive bleeding, or are taking blood-thinner medicines.    Be careful if you are given prescription medicines, opioids, or medicine for muscle spasm. They can cause drowsiness, and affect your coordination, reflexes, and judgment. Don't drive or operate heavy machinery when taking these types of medicines. Only take pain medicine as prescribed by your healthcare provider.  Follow-up care  Follow up with your healthcare provider, or as advised. You may need physical therapy or more tests if your symptoms get worse.  If you had X-rays, your healthcare provider may be checking for any broken bones, breaks, or fractures. Bruises and sprains can sometimes hurt as much as a fracture. These injuries can take time to heal fully. If your symptoms don t get better or they get worse, talk with your healthcare provider. You may need a repeat X-ray or other tests.  Call 911  Call 911 if any of the following occur:    Trouble breathing    Confused    Very drowsy or trouble awakening    Fainting or  loss of consciousness    Rapid or very slow heart rate    Loss of bowel or bladder control  When to seek medical advice  Call your healthcare provider right away if any of the following occur:    Pain gets worse or spreads to your arms or legs    Weakness or numbness in one or both arms or legs    Numbness in the groin or genital area  [employer] last reviewed this educational content on 11/1/2019 2000-2021 [employer]. All rights reserved. This information is not intended as a substitute for professional medical care. Always follow your healthcare professional's instructions.

## 2021-09-12 ENCOUNTER — HEALTH MAINTENANCE LETTER (OUTPATIENT)
Age: 36
End: 2021-09-12

## 2021-10-27 ENCOUNTER — OFFICE VISIT (OUTPATIENT)
Dept: FAMILY MEDICINE | Facility: CLINIC | Age: 36
End: 2021-10-27
Payer: COMMERCIAL

## 2021-10-27 VITALS
DIASTOLIC BLOOD PRESSURE: 84 MMHG | HEIGHT: 68 IN | HEART RATE: 86 BPM | WEIGHT: 177 LBS | RESPIRATION RATE: 16 BRPM | BODY MASS INDEX: 26.83 KG/M2 | OXYGEN SATURATION: 99 % | TEMPERATURE: 98.5 F | SYSTOLIC BLOOD PRESSURE: 138 MMHG

## 2021-10-27 DIAGNOSIS — H01.9 DERMATITIS OF EYELID OF RIGHT EYE, UNSPECIFIED TYPE: Primary | ICD-10-CM

## 2021-10-27 DIAGNOSIS — Z23 NEED FOR PROPHYLACTIC VACCINATION AND INOCULATION AGAINST INFLUENZA: ICD-10-CM

## 2021-10-27 PROCEDURE — 99213 OFFICE O/P EST LOW 20 MIN: CPT | Mod: 25 | Performed by: FAMILY MEDICINE

## 2021-10-27 PROCEDURE — 90471 IMMUNIZATION ADMIN: CPT | Performed by: FAMILY MEDICINE

## 2021-10-27 PROCEDURE — 90686 IIV4 VACC NO PRSV 0.5 ML IM: CPT | Performed by: FAMILY MEDICINE

## 2021-10-27 ASSESSMENT — MIFFLIN-ST. JEOR: SCORE: 1689.43

## 2021-10-27 NOTE — PROGRESS NOTES
"  Assessment & Plan     Dermatitis of eyelid of right eye, unspecified type  Differentials discussed in detail.  Symptoms likely secondary to dermatitis, single eyelash hair removed from right lower conjunctival sac.  Suggested to apply cortisone cream and use OTC antihistamine for itch.  Follow-up as needed.  All questions answered.      Benton Hardy MD  [facility]    Val Hawk is a 36 year old who presents for the following health issues     HPI     Eye(s) Problem  Onset/Duration:  1 week   Description:   Location: YES- right eye   Pain: no  Redness: YES  Accompanying Signs & Symptoms:  Discharge/mattering: no  Swelling: YES  Visual changes: no  Fever: no  Nasal Congestion: no  Bothered by bright lights: no  History:  Trauma: no  Foreign body exposure: no  Wearing contacts: no  Precipitating or alleviating factors: None  Therapies tried and outcome: cortisone cream       Review of Systems   Constitutional, HEENT, cardiovascular, pulmonary, gi and gu systems are negative, except as otherwise noted.      Objective    /84   Pulse 86   Temp 98.5  F (36.9  C) (Tympanic)   Resp 16   Ht 1.715 m (5' 7.5\")   Wt 80.3 kg (177 lb)   SpO2 99%   BMI 27.31 kg/m    Body mass index is 27.31 kg/m .  Physical Exam   GENERAL: alert and no distress  EYES: PERRL, EOMI, slightly erythematous skin involving right lower eyelid, no stye, conjunctival injection, discharge noted, single eyelash hair in right lower conjunctival sac removed with cotton-tipped applicator, normal fundus exam  NECK: no adenopathy, no asymmetry, masses, or scars and thyroid normal to palpation  NEURO: Normal strength and tone, mentation intact and speech normal  PSYCH: mentation appears normal, affect normal/bright                      "

## 2021-12-06 DIAGNOSIS — H01.9 DERMATITIS OF EYELID OF RIGHT EYE, UNSPECIFIED TYPE: Primary | ICD-10-CM

## 2021-12-09 ENCOUNTER — OFFICE VISIT (OUTPATIENT)
Dept: DERMATOLOGY | Facility: CLINIC | Age: 36
End: 2021-12-09
Attending: FAMILY MEDICINE
Payer: COMMERCIAL

## 2021-12-09 VITALS — OXYGEN SATURATION: 95 % | DIASTOLIC BLOOD PRESSURE: 89 MMHG | HEART RATE: 106 BPM | SYSTOLIC BLOOD PRESSURE: 146 MMHG

## 2021-12-09 DIAGNOSIS — H01.139 EYELID DERMATITIS, ECZEMATOUS, UNSPECIFIED LATERALITY: Primary | ICD-10-CM

## 2021-12-09 PROCEDURE — 99203 OFFICE O/P NEW LOW 30 MIN: CPT | Performed by: PHYSICIAN ASSISTANT

## 2021-12-09 RX ORDER — PIMECROLIMUS 10 MG/G
CREAM TOPICAL
Qty: 30 G | Refills: 5 | Status: SHIPPED | OUTPATIENT
Start: 2021-12-09 | End: 2022-01-17

## 2021-12-09 NOTE — LETTER
12/9/2021         RE: Sachin Palma  [address]  [facility] [address]        Dear Colleague,    Thank you for referring your patient, Sachin Palma, to the [facility]. Please see a copy of my visit note below.    HPI:   Chief complaints: Sachin Palma is a pleasant 36 year old male who presents for evaluation of a persistent rash on the eyelids. It has been present for awhile. He has tried OTC moisturizers and hydrocortisone but these have not eliminated the rash. It is very itchy. He has not started any new personal care products.     SHx: works as an     PHYSICAL EXAM:    BP (!) 146/89   Pulse 106   SpO2 95%   Skin exam performed as follows: Type 2 skin. Mood appropriate  Alert and Oriented X 3. Well developed, well nourished in no distress.  General appearance: Normal  Head including face: Normal  Eyes: conjunctiva and lids: Normal  Mouth: Lips, teeth, gums: Normal  Neck: Normal  Cardiovascular: Exam of peripheral vascular system by observation for swelling, varicosities, edema: Normal  Right upper extremity: Normal  Left upper extremity: Normal  Right lower extremity: Normal  Left lower extremity: Normal  Skin: Scalp and body hair: See below    Dermatitis on bilateral upper and lower eyelids    ASSESSMENT/PLAN:     1. Eyelid dermatitis, likely eczematous - advised on condition. Discussed difficulty in determining an exact etiology, but that the most common culprits are contact, seb derm, psoriasis and eczema.   --Start Elidel BID PRN  --Continue gentle emollients        Follow-up: 6-8 weeks if needed  CC:   Scribed By: Camila Kiser, MS, PAVincentC          Again, thank you for allowing me to participate in the care of your patient.        Sincerely,        Camila Kiser PA-C

## 2021-12-09 NOTE — PROGRESS NOTES
HPI:   Chief complaints: Sachin Palma is a pleasant 36 year old male who presents for evaluation of a persistent rash on the eyelids. It has been present for awhile. He has tried OTC moisturizers and hydrocortisone but these have not eliminated the rash. It is very itchy. He has not started any new personal care products.     SHx: works as an     PHYSICAL EXAM:    BP (!) 146/89   Pulse 106   SpO2 95%   Skin exam performed as follows: Type 2 skin. Mood appropriate  Alert and Oriented X 3. Well developed, well nourished in no distress.  General appearance: Normal  Head including face: Normal  Eyes: conjunctiva and lids: Normal  Mouth: Lips, teeth, gums: Normal  Neck: Normal  Cardiovascular: Exam of peripheral vascular system by observation for swelling, varicosities, edema: Normal  Right upper extremity: Normal  Left upper extremity: Normal  Right lower extremity: Normal  Left lower extremity: Normal  Skin: Scalp and body hair: See below    Dermatitis on bilateral upper and lower eyelids    ASSESSMENT/PLAN:     1. Eyelid dermatitis, likely eczematous - advised on condition. Discussed difficulty in determining an exact etiology, but that the most common culprits are contact, seb derm, psoriasis and eczema.   --Start Elidel BID PRN  --Continue gentle emollients        Follow-up: 6-8 weeks if needed  CC:   Scribed By: Camila Garsia MS, PA-C

## 2021-12-09 NOTE — NURSING NOTE
"Initial BP (!) 146/89   Pulse 106   SpO2 95%  Estimated body mass index is 27.31 kg/m  as calculated from the following:    Height as of 10/27/21: 1.715 m (5' 7.5\").    Weight as of 10/27/21: 80.3 kg (177 lb). .      "

## 2022-01-17 ENCOUNTER — HOSPITAL ENCOUNTER (OUTPATIENT)
Dept: CT IMAGING | Facility: CLINIC | Age: 37
Discharge: HOME | End: 2022-01-17
Attending: FAMILY MEDICINE | Admitting: FAMILY MEDICINE
Payer: COMMERCIAL

## 2022-01-17 ENCOUNTER — OFFICE VISIT (OUTPATIENT)
Dept: FAMILY MEDICINE | Facility: CLINIC | Age: 37
End: 2022-01-17
Payer: COMMERCIAL

## 2022-01-17 VITALS
WEIGHT: 186 LBS | DIASTOLIC BLOOD PRESSURE: 74 MMHG | SYSTOLIC BLOOD PRESSURE: 138 MMHG | HEIGHT: 68 IN | TEMPERATURE: 97.7 F | RESPIRATION RATE: 18 BRPM | OXYGEN SATURATION: 98 % | BODY MASS INDEX: 28.19 KG/M2 | HEART RATE: 102 BPM

## 2022-01-17 DIAGNOSIS — R10.32 LLQ ABDOMINAL PAIN: Primary | ICD-10-CM

## 2022-01-17 DIAGNOSIS — R10.32 LLQ ABDOMINAL PAIN: ICD-10-CM

## 2022-01-17 LAB
ALBUMIN UR-MCNC: NEGATIVE MG/DL
ANION GAP SERPL CALCULATED.3IONS-SCNC: 4 MMOL/L (ref 3–14)
APPEARANCE UR: CLEAR
BILIRUB UR QL STRIP: NEGATIVE
BUN SERPL-MCNC: 23 MG/DL (ref 7–30)
CALCIUM SERPL-MCNC: 9.6 MG/DL (ref 8.5–10.1)
CHLORIDE BLD-SCNC: 107 MMOL/L (ref 94–109)
CO2 SERPL-SCNC: 29 MMOL/L (ref 20–32)
COLOR UR AUTO: YELLOW
CREAT SERPL-MCNC: 1.11 MG/DL (ref 0.66–1.25)
ERYTHROCYTE [DISTWIDTH] IN BLOOD BY AUTOMATED COUNT: 12.3 % (ref 10–15)
GFR SERPL CREATININE-BSD FRML MDRD: 87 ML/MIN/1.73M2
GLUCOSE BLD-MCNC: 98 MG/DL (ref 70–99)
GLUCOSE UR STRIP-MCNC: NEGATIVE MG/DL
HCT VFR BLD AUTO: 40.4 % (ref 40–53)
HGB BLD-MCNC: 14.2 G/DL (ref 13.3–17.7)
HGB UR QL STRIP: NEGATIVE
KETONES UR STRIP-MCNC: NEGATIVE MG/DL
LEUKOCYTE ESTERASE UR QL STRIP: NEGATIVE
MCH RBC QN AUTO: 32.8 PG (ref 26.5–33)
MCHC RBC AUTO-ENTMCNC: 35.1 G/DL (ref 31.5–36.5)
MCV RBC AUTO: 93 FL (ref 78–100)
NITRATE UR QL: NEGATIVE
PH UR STRIP: 7 [PH] (ref 5–7)
PLATELET # BLD AUTO: 240 10E3/UL (ref 150–450)
POTASSIUM BLD-SCNC: 4.9 MMOL/L (ref 3.4–5.3)
RBC # BLD AUTO: 4.33 10E6/UL (ref 4.4–5.9)
SODIUM SERPL-SCNC: 140 MMOL/L (ref 133–144)
SP GR UR STRIP: 1.02 (ref 1–1.03)
UROBILINOGEN UR STRIP-ACNC: 0.2 E.U./DL
WBC # BLD AUTO: 9.6 10E3/UL (ref 4–11)

## 2022-01-17 PROCEDURE — 99214 OFFICE O/P EST MOD 30 MIN: CPT | Performed by: FAMILY MEDICINE

## 2022-01-17 PROCEDURE — 250N000011 HC RX IP 250 OP 636: Performed by: FAMILY MEDICINE

## 2022-01-17 PROCEDURE — 250N000009 HC RX 250: Performed by: FAMILY MEDICINE

## 2022-01-17 PROCEDURE — 74177 CT ABD & PELVIS W/CONTRAST: CPT

## 2022-01-17 PROCEDURE — 85027 COMPLETE CBC AUTOMATED: CPT | Performed by: FAMILY MEDICINE

## 2022-01-17 PROCEDURE — 80048 BASIC METABOLIC PNL TOTAL CA: CPT | Performed by: FAMILY MEDICINE

## 2022-01-17 PROCEDURE — 81003 URINALYSIS AUTO W/O SCOPE: CPT | Performed by: FAMILY MEDICINE

## 2022-01-17 PROCEDURE — 36415 COLL VENOUS BLD VENIPUNCTURE: CPT | Performed by: FAMILY MEDICINE

## 2022-01-17 RX ORDER — IOPAMIDOL 755 MG/ML
91 INJECTION, SOLUTION INTRAVASCULAR ONCE
Status: COMPLETED | OUTPATIENT
Start: 2022-01-17 | End: 2022-01-17

## 2022-01-17 RX ADMIN — IOPAMIDOL 91 ML: 755 INJECTION, SOLUTION INTRAVENOUS at 17:38

## 2022-01-17 RX ADMIN — SODIUM CHLORIDE 63 ML: 9 INJECTION, SOLUTION INTRAVENOUS at 17:38

## 2022-01-17 ASSESSMENT — MIFFLIN-ST. JEOR: SCORE: 1725.25

## 2022-01-17 ASSESSMENT — PAIN SCALES - GENERAL: PAINLEVEL: EXTREME PAIN (8)

## 2022-01-17 NOTE — PROGRESS NOTES
Assessment & Plan     Q abdominal pain  37-year-old male presented with left lower quadrant abdominal pain which he has been experiencing since Friday.  No other relevant systemic symptoms.  Physical examination remarkable for localized left lower quadrant tenderness.  Differentials discussed in detail including but not limited to diverticulitis, ureterolithiasis, abdominal hernia, mass lesion and muscular strain.  CBC, BMP, UA and CT abdomen/pelvis ordered for further evaluation.  Recommended to continue well hydration, healthy diet.  Will inform him of lab results, imaging once available.  Instructed to go ER if symptoms worsen.  All questions answered.  - CT Abdomen Pelvis w Contrast; Future  - UA reflex to Microscopic - lab collect; Future  - CBC with platelets; Future  - Basic metabolic panel  (Ca, Cl, CO2, Creat, Gluc, K, Na, BUN); Future      Benton Hardy MD  [facility]    Val Hawk is a 37 year old who presents for the following health issues     HPI     ABDOMINAL   PAIN     Onset: 3 days    Description:   Character: Sharp and Dull ache  Location: left lower quadrant  Radiation: None    Intensity: 8/10    Progression of Symptoms:  same    Accompanying Signs & Symptoms:  Fever/Chills?: no   Gas/Bloating: no   Nausea: no   Vomitting: no   Diarrhea?: no   Constipation:no   Dysuria or Hematuria: no    History:   Trauma: YES- abdominal surgery when he was a baby  Previous similar pain: no    Previous tests done: none    Precipitating factors:   Does the pain change with:     Food: no      BM: no     Urination: no     Alleviating factors:  none    Therapies Tried and outcome: advil, ice, heat    LMP:  not applicable         Review of Systems   Constitutional, HEENT, cardiovascular, pulmonary, GI, , musculoskeletal, neuro, skin, endocrine and psych systems are negative, except as otherwise noted.      Objective    /74 (BP Location: Right arm, Cuff Size: Adult Large)  "  Pulse 102   Temp 97.7  F (36.5  C) (Tympanic)   Resp 18   Ht 1.715 m (5' 7.5\")   Wt 84.4 kg (186 lb)   SpO2 98%   BMI 28.70 kg/m    Body mass index is 28.7 kg/m .  Physical Exam   GENERAL: alert and no distress  EYES: Eyes grossly normal to inspection, PERRL and conjunctivae and sclerae normal  NECK: no adenopathy, no asymmetry, masses, or scars and thyroid normal to palpation  RESP: lungs clear to auscultation - no rales, rhonchi or wheezes  CV: regular rate and rhythm, normal S1 S2, no S3 or S4, no murmur, click or rub, no peripheral edema and peripheral pulses strong  ABDOMEN: soft, moderate tender left lower quadrant, no hernia, guarding or rigidity noted  MS: no gross musculoskeletal defects noted, no edema  SKIN: no suspicious lesions or rashes  NEURO: Normal strength and tone, mentation intact and speech normal  PSYCH: mentation appears normal, affect normal/bright            "

## 2022-11-19 ENCOUNTER — HEALTH MAINTENANCE LETTER (OUTPATIENT)
Age: 37
End: 2022-11-19

## 2024-01-28 ENCOUNTER — HEALTH MAINTENANCE LETTER (OUTPATIENT)
Age: 39
End: 2024-01-28

## 2024-09-03 NOTE — LETTER
2/9/2018         RE: Sachin Palma  [address] [place] [address]        Dear Colleague,    Thank you for referring your patient, Sachin Palma, to the [facility]. Please see a copy of my visit note below.    Sports Medicine Clinic Visit  PCP: No Ref-Primary, Physician    Sachin Palma is a 33 year old male who is seen in consultation at the request of Dmitry Walden MD presenting with bilateral shoulder pain (R > L).    Injury: Patient reports bilateral shoulder pain (R > L) for the past 2-3 years.  He denies injury, reports insidious onset that has been gradually getting worse very slowly.  Pain is at his AC joints bilaterally, worse with overhead movements and repetitive activities.  - Did see [facility] who discussed injection and surgical options.    Location of Pain: right shoulder  Duration of Pain: 2-3 year(s)  Rating of Pain at worst: 7/10  Rating of Pain Currently: 4/10  Symptoms are better with: nothing  Symptoms are worse with: overhead motions, adduction, weight lifting  Additional Features:   Positive: weakness   Negative: popping, grinding, catching, locking, instability, paresthesias and numbness  Other evaluation and/or treatments so far consists of: Ice, Ibuprofen, rest and chiropractic, home exercises  Prior History of related problems: none previous to past 2-3 years    Social History: , weight lifting, former baseball/football player    Review of Systems  Skin: no bruising, no swelling  Musculoskeletal: as above  Neurologic: no numbness, paresthesias  Remainder of review of systems is negative including constitutional, CV, pulmonary, GI, except as noted in HPI or medical history.    Patient's current problem list, past medical and surgical history, and family history were reviewed.    Patient Active Problem List   Diagnosis     AC joint arthropathy     Impingement syndrome, shoulder, right     No past medical history     Important Medication Changes:       Start Crestor 5 mg daily     Further testing to be done:     Lipid panel in about 5 months (fasting, do not eat or drink anything besides water the morning of)     Next follow up visit: In office in 6 months.    Recommend that you have a follow up with Dr. Santiago about your valve issues.    Talk to your primary doctor about swelling of your hands.    Avoid alcohol, caffeine, and sleep deprivation.  Correct fevers independent of infection- treat with acetaminophen.   Stay well hydrated and maintain electrolyte balance.  Minimize stress.    Before taking any new medications (over the counter, supplements, or new prescriptions), always consult with a pharmacist or your primary care provider and let them know of your history/or predisposition with long QT interval.   Recommend the oswaldo Zerto.        HERE IS SOME IMPORTANT INFORMATION FOR OUR PATIENTS    If you need refills- call your pharmacist and they will contact our office.    If you have medical concerns call    [phone] or [phone]     You may consider signing up for [employer], our popular online communication portal, ask for a refill, see your results, or message our staff. Go to: [email or web address]     Dr. Nestor Grover               "on file.  No past surgical history on file.  Family History   Problem Relation Age of Onset     Hypertension Mother      Other - See Comments Father      Stroke     Hyperlipidemia Father        Objective  /72 (BP Location: Right arm, Patient Position: Sitting, Cuff Size: Adult Large)  Ht 5' 8.25\" (1.734 m)  Wt 172 lb (78 kg)  BMI 25.96 kg/m2    GENERAL APPEARANCE: healthy, alert and no distress   GAIT: NORMAL  SKIN: no suspicious lesions or rashes  HEENT: Sclera clear, anicteric  CV: good peripheral pulses  RESP: Breathing not labored  NEURO: Normal strength and tone, mentation intact and speech normal  PSYCH:  mentation appears normal and affect normal/bright    Bilateral Shoulder exam  Inspection and Posture:       normal    Skin:        no visible deformities    Tender:        acromioclavicular joint bilateral    Non Tender:       remainder of shoulder bilateral    ROM:        Full active and passive ROM with flexion, extension, abduction, internal and external rotation bilateral       asymmetric scapular motion    Painful motions:       end range flexion and elevation bilateral    Strength:        abduction 5/5 bilateral       flexion 5/5 bilateral       internal rotation 5/5 bilateral       external rotation 5/5 bilateral    Impingement testing:       neg (-) Neer bilateral       neg (-) Keita bilateral       neg (-) crossover bilateral       positive (+) O'jessy bilateral    Sensation:        normal sensation over shoulder and upper extremity     Radiology  I reviewed outside images images with the patient  3 XR views of bilateral reviewed: no acute bony abnormality, no significant degenerative change  - will follow official read    Assessment:  1. Arthralgia of both acromioclavicular joints    2. Bilateral shoulder pain, unspecified chronicity      We discussed these other possible diagnosis: Impingement vs. Labral tear  We discussed the following treatment options: symptom treatment, activity " modification/rest, imaging, rehab and referral. Following discussion, plan: will start with physical therapy and consider other options for treatment.    Plan:  - Today's Plan of Care:  Rehab: Physical Therapy: [facility] - [phone]    -We also discussed other future treatment options:  MRI or MRI arthrogram of the right shoulder  Injection (AC joint or subacromial)    Follow Up: 4-6 weeks    Concerning signs and symptoms were reviewed.  The patient expressed understanding of this management plan and all questions were answered at this time.    Thanks for the opportunity to participate in the care of this patient, I will keep you updated on their progress.    CC: Dmitry Salazar MD CAQ  Primary Care Sports Medicine  [facility]    Again, thank you for allowing me to participate in the care of your patient.        Sincerely,        Cecilia Salazar MD

## 2025-02-01 ENCOUNTER — HEALTH MAINTENANCE LETTER (OUTPATIENT)
Age: 40
End: 2025-02-01

## (undated) RX ORDER — EPINEPHRINE 1 MG/ML
INJECTION, SOLUTION, CONCENTRATE INTRAVENOUS
Status: DISPENSED
Start: 2018-05-14

## (undated) RX ORDER — LIDOCAINE HYDROCHLORIDE 10 MG/ML
INJECTION, SOLUTION EPIDURAL; INFILTRATION; INTRACAUDAL; PERINEURAL
Status: DISPENSED
Start: 2018-05-14